# Patient Record
Sex: FEMALE | Race: WHITE | HISPANIC OR LATINO | Employment: PART TIME | URBAN - NONMETROPOLITAN AREA
[De-identification: names, ages, dates, MRNs, and addresses within clinical notes are randomized per-mention and may not be internally consistent; named-entity substitution may affect disease eponyms.]

---

## 2017-08-02 ENCOUNTER — HOSPITAL ENCOUNTER (EMERGENCY)
Facility: HOSPITAL | Age: 42
Discharge: HOME | End: 2017-08-02
Attending: PHYSICIAN ASSISTANT | Admitting: PHYSICIAN ASSISTANT
Payer: MEDICARE

## 2017-08-02 VITALS
HEART RATE: 70 BPM | SYSTOLIC BLOOD PRESSURE: 128 MMHG | RESPIRATION RATE: 16 BRPM | OXYGEN SATURATION: 97 % | TEMPERATURE: 98.2 F | DIASTOLIC BLOOD PRESSURE: 92 MMHG

## 2017-08-02 DIAGNOSIS — R53.1 WEAKNESS: ICD-10-CM

## 2017-08-02 DIAGNOSIS — R00.2 PALPITATIONS: ICD-10-CM

## 2017-08-02 DIAGNOSIS — E16.2 HYPOGLYCEMIA: ICD-10-CM

## 2017-08-02 LAB
ALBUMIN SERPL-MCNC: 3.7 G/DL (ref 3.4–5)
ALP SERPL-CCNC: 126 U/L (ref 40–150)
ALT SERPL W P-5'-P-CCNC: 25 U/L (ref 0–50)
ANION GAP SERPL CALCULATED.3IONS-SCNC: 5 MMOL/L (ref 3–14)
AST SERPL W P-5'-P-CCNC: 17 U/L (ref 0–45)
BASOPHILS # BLD AUTO: 0 10E9/L (ref 0–0.2)
BASOPHILS NFR BLD AUTO: 0.6 %
BILIRUB SERPL-MCNC: 0.5 MG/DL (ref 0.2–1.3)
BUN SERPL-MCNC: 17 MG/DL (ref 7–30)
CALCIUM SERPL-MCNC: 8.4 MG/DL (ref 8.5–10.1)
CHLORIDE SERPL-SCNC: 108 MMOL/L (ref 94–109)
CO2 SERPL-SCNC: 31 MMOL/L (ref 20–32)
CREAT SERPL-MCNC: 0.92 MG/DL (ref 0.52–1.04)
D DIMER PPP DDU-MCNC: <200 NG/ML D-DU (ref 0–300)
DIFFERENTIAL METHOD BLD: NORMAL
EOSINOPHIL # BLD AUTO: 0.1 10E9/L (ref 0–0.7)
EOSINOPHIL NFR BLD AUTO: 1.3 %
ERYTHROCYTE [DISTWIDTH] IN BLOOD BY AUTOMATED COUNT: 13 % (ref 10–15)
GFR SERPL CREATININE-BSD FRML MDRD: 67 ML/MIN/1.7M2
GLUCOSE SERPL-MCNC: 62 MG/DL (ref 70–99)
HCT VFR BLD AUTO: 42.3 % (ref 35–47)
HGB BLD-MCNC: 14.2 G/DL (ref 11.7–15.7)
IMM GRANULOCYTES # BLD: 0 10E9/L (ref 0–0.4)
IMM GRANULOCYTES NFR BLD: 0.2 %
LYMPHOCYTES # BLD AUTO: 2.4 10E9/L (ref 0.8–5.3)
LYMPHOCYTES NFR BLD AUTO: 38 %
MCH RBC QN AUTO: 29.8 PG (ref 26.5–33)
MCHC RBC AUTO-ENTMCNC: 33.6 G/DL (ref 31.5–36.5)
MCV RBC AUTO: 89 FL (ref 78–100)
MONOCYTES # BLD AUTO: 0.5 10E9/L (ref 0–1.3)
MONOCYTES NFR BLD AUTO: 7.9 %
NEUTROPHILS # BLD AUTO: 3.3 10E9/L (ref 1.6–8.3)
NEUTROPHILS NFR BLD AUTO: 52 %
NRBC # BLD AUTO: 0 10*3/UL
NRBC BLD AUTO-RTO: 0 /100
PLATELET # BLD AUTO: 236 10E9/L (ref 150–450)
POTASSIUM SERPL-SCNC: 3.8 MMOL/L (ref 3.4–5.3)
PROT SERPL-MCNC: 7.2 G/DL (ref 6.8–8.8)
RBC # BLD AUTO: 4.77 10E12/L (ref 3.8–5.2)
SODIUM SERPL-SCNC: 144 MMOL/L (ref 133–144)
TROPONIN I SERPL-MCNC: NORMAL UG/L (ref 0–0.04)
WBC # BLD AUTO: 6.3 10E9/L (ref 4–11)

## 2017-08-02 PROCEDURE — 85025 COMPLETE CBC W/AUTO DIFF WBC: CPT | Performed by: PHYSICIAN ASSISTANT

## 2017-08-02 PROCEDURE — 85379 FIBRIN DEGRADATION QUANT: CPT | Performed by: PHYSICIAN ASSISTANT

## 2017-08-02 PROCEDURE — 84484 ASSAY OF TROPONIN QUANT: CPT | Performed by: PHYSICIAN ASSISTANT

## 2017-08-02 PROCEDURE — 99284 EMERGENCY DEPT VISIT MOD MDM: CPT | Mod: 25

## 2017-08-02 PROCEDURE — 96360 HYDRATION IV INFUSION INIT: CPT

## 2017-08-02 PROCEDURE — 99284 EMERGENCY DEPT VISIT MOD MDM: CPT | Performed by: PHYSICIAN ASSISTANT

## 2017-08-02 PROCEDURE — 25000128 H RX IP 250 OP 636: Performed by: PHYSICIAN ASSISTANT

## 2017-08-02 PROCEDURE — 36415 COLL VENOUS BLD VENIPUNCTURE: CPT | Performed by: PHYSICIAN ASSISTANT

## 2017-08-02 PROCEDURE — 93005 ELECTROCARDIOGRAM TRACING: CPT

## 2017-08-02 PROCEDURE — 80053 COMPREHEN METABOLIC PANEL: CPT | Performed by: PHYSICIAN ASSISTANT

## 2017-08-02 PROCEDURE — 93010 ELECTROCARDIOGRAM REPORT: CPT | Performed by: INTERNAL MEDICINE

## 2017-08-02 RX ORDER — LORATADINE 10 MG/1
10 TABLET ORAL DAILY
COMMUNITY

## 2017-08-02 RX ORDER — LIDOCAINE 40 MG/G
CREAM TOPICAL
Status: DISCONTINUED | OUTPATIENT
Start: 2017-08-02 | End: 2017-08-02 | Stop reason: HOSPADM

## 2017-08-02 RX ORDER — SODIUM CHLORIDE 9 MG/ML
1000 INJECTION, SOLUTION INTRAVENOUS CONTINUOUS
Status: DISCONTINUED | OUTPATIENT
Start: 2017-08-02 | End: 2017-08-02 | Stop reason: HOSPADM

## 2017-08-02 RX ORDER — CETIRIZINE HYDROCHLORIDE 10 MG/1
5 TABLET ORAL DAILY
COMMUNITY

## 2017-08-02 RX ORDER — MULTIVITAMIN,THERAPEUTIC
1 TABLET ORAL DAILY
COMMUNITY

## 2017-08-02 RX ADMIN — SODIUM CHLORIDE 1000 ML: 9 INJECTION, SOLUTION INTRAVENOUS at 14:24

## 2017-08-02 ASSESSMENT — ENCOUNTER SYMPTOMS
APPETITE CHANGE: 0
DIZZINESS: 1
VOMITING: 0
PALPITATIONS: 1
BRUISES/BLEEDS EASILY: 0
FATIGUE: 1
WEAKNESS: 0
ACTIVITY CHANGE: 0
ABDOMINAL PAIN: 0
FEVER: 0
NUMBNESS: 0
LIGHT-HEADEDNESS: 1
HEADACHES: 1
SHORTNESS OF BREATH: 0
CHEST TIGHTNESS: 0
NAUSEA: 0
CHILLS: 0

## 2017-08-02 NOTE — ED AVS SNAPSHOT
[facility]    [address]    [place] [address]    Phone:  [phone]                                       Brittany Arcos   MRN: [id number]    Department:  [facility]   Date of Visit:  8/2/2017           After Visit Summary Signature Page     I have received my discharge instructions, and my questions have been answered. I have discussed any challenges I see with this plan with the nurse or doctor.    ..........................................................................................................................................  Patient/Patient Representative Signature      ..........................................................................................................................................  Patient Representative Print Name and Relationship to Patient    ..................................................               ................................................  Date                                            Time    ..........................................................................................................................................  Reviewed by Signature/Title    ...................................................              ..............................................  Date                                                            Time

## 2017-08-02 NOTE — ED PROVIDER NOTES
"  History     Chief Complaint   Patient presents with     Palpitations     notes felt like her heart was racing earlier, notes thought her blood sugar was low so had a sandwich and fruit and still not feeling better. states lightheaded at present and \"face feels like its on fire\"     Headache     c/o h/a     HPI  Brittany Arcos is a 41 year old female who presented to the ED ambulatory for evaluation of palpitations, dizziness, and lightheadedness.  Brittany was in the facility and acting as a  for a friend that is having surgery.  She had not eaten and began to feel like her heart was racing and her face was hot.  She went to the cafeteria and ate.  She began to feel a little better and then dizzy and lightheaded.  She went to the  and asked if there was a place that could check her BP.  She was referred to the  and then here.  During my interview, Brittany tells me that she doesn't feel very good, \"But that's not really out of the ordinary for me ever since I had my gastric bypass.\"  Mild headache.  This is apparently no totally strange for her either. No falls.  No chest pain.  No dyspnea.  Remote hx of WPW with ablation.  No abdominal pain.  No vomiting.      I have reviewed the Medications, Allergies, Past Medical and Surgical History, and Social History in the Epic system.    Allergies:   Allergies   Allergen Reactions     Amoxicillin      Aspirin      Ceclor [Cefaclor]      Cefzil [Cefprozil]      Clindamycin      Keflex [Cephalexin]      Latex      Penicillins          No current facility-administered medications on file prior to encounter.   No current outpatient prescriptions on file prior to encounter.    There is no problem list on file for this patient.      No past surgical history on file.    Social History   Substance Use Topics     Smoking status: Not on file     Smokeless tobacco: Not on file     Alcohol use Not on file         There is no immunization history on file for this " patient.    BMI: There is no height or weight on file to calculate BMI.      Review of Systems   Constitutional: Positive for fatigue. Negative for activity change, appetite change, chills and fever.   Respiratory: Negative for chest tightness and shortness of breath.    Cardiovascular: Positive for palpitations. Negative for chest pain and leg swelling.   Gastrointestinal: Negative for abdominal pain, nausea and vomiting.   Genitourinary: Negative.    Skin: Negative.    Neurological: Positive for dizziness, light-headedness and headaches. Negative for weakness and numbness.   Hematological: Does not bruise/bleed easily.       Physical Exam   BP: 146/95  Heart Rate: 104  Temp: 97.8  F (36.6  C)  Resp: 14  SpO2: 98 %  Physical Exam   Constitutional: She is oriented to person, place, and time. She appears well-developed and well-nourished. No distress.   Pleasant and talkative    Cardiovascular: Normal rate and regular rhythm.    Pulmonary/Chest: Effort normal and breath sounds normal.   Abdominal: Soft. There is no tenderness. There is no guarding.   Neurological: She is alert and oriented to person, place, and time.   No focal concerns    Skin: Skin is warm and dry.   Psychiatric: She has a normal mood and affect.   Nursing note and vitals reviewed.      ED Course     ED Course     Procedures        EKG shows NSR with sinus arrhythmia.  No signs of pre-excitation, ST or T wave concerns.      Medications   lidocaine 1 % 1 mL (not administered)   lidocaine (LMX4) kit (not administered)   sodium chloride (PF) 0.9% PF flush 3 mL (not administered)   sodium chloride (PF) 0.9% PF flush 3 mL (not administered)   0.9% sodium chloride BOLUS (0 mLs Intravenous Stopped 8/2/17 1521)     Followed by   0.9% sodium chloride infusion (not administered)     Results for orders placed or performed during the hospital encounter of 08/02/17 (from the past 24 hour(s))   CBC with platelets differential   Result Value Ref Range    WBC 6.3  4.0 - 11.0 10e9/L    RBC Count 4.77 3.8 - 5.2 10e12/L    Hemoglobin 14.2 11.7 - 15.7 g/dL    Hematocrit 42.3 35.0 - 47.0 %    MCV 89 78 - 100 fl    MCH 29.8 26.5 - 33.0 pg    MCHC 33.6 31.5 - 36.5 g/dL    RDW 13.0 10.0 - 15.0 %    Platelet Count 236 150 - 450 10e9/L    Diff Method Automated Method     % Neutrophils 52.0 %    % Lymphocytes 38.0 %    % Monocytes 7.9 %    % Eosinophils 1.3 %    % Basophils 0.6 %    % Immature Granulocytes 0.2 %    Nucleated RBCs 0 0 /100    Absolute Neutrophil 3.3 1.6 - 8.3 10e9/L    Absolute Lymphocytes 2.4 0.8 - 5.3 10e9/L    Absolute Monocytes 0.5 0.0 - 1.3 10e9/L    Absolute Eosinophils 0.1 0.0 - 0.7 10e9/L    Absolute Basophils 0.0 0.0 - 0.2 10e9/L    Abs Immature Granulocytes 0.0 0 - 0.4 10e9/L    Absolute Nucleated RBC 0.0    D-Dimer (FV Range)   Result Value Ref Range    D-Dimer ng/mL <200 0 - 300 ng/ml D-DU   Comprehensive metabolic panel   Result Value Ref Range    Sodium 144 133 - 144 mmol/L    Potassium 3.8 3.4 - 5.3 mmol/L    Chloride 108 94 - 109 mmol/L    Carbon Dioxide 31 20 - 32 mmol/L    Anion Gap 5 3 - 14 mmol/L    Glucose 62 (L) 70 - 99 mg/dL    Urea Nitrogen 17 7 - 30 mg/dL    Creatinine 0.92 0.52 - 1.04 mg/dL    GFR Estimate 67 >60 mL/min/1.7m2    GFR Estimate If Black 81 >60 mL/min/1.7m2    Calcium 8.4 (L) 8.5 - 10.1 mg/dL    Bilirubin Total 0.5 0.2 - 1.3 mg/dL    Albumin 3.7 3.4 - 5.0 g/dL    Protein Total 7.2 6.8 - 8.8 g/dL    Alkaline Phosphatase 126 40 - 150 U/L    ALT 25 0 - 50 U/L    AST 17 0 - 45 U/L   Troponin I   Result Value Ref Range    Troponin I ES  0.000 - 0.045 ug/L     <0.015  The 99th percentile for upper reference range is 0.045 ug/L.  Troponin values in   the range of 0.045 - 0.120 ug/L may be associated with risks of adverse   clinical events.         Critical Care time:  none               Labs Ordered and Resulted from Time of ED Arrival Up to the Time of Departure from the ED   COMPREHENSIVE METABOLIC PANEL - Abnormal; Notable for the  following:        Result Value    Glucose 62 (*)     Calcium 8.4 (*)     All other components within normal limits   CBC WITH PLATELETS DIFFERENTIAL   D-DIMER (FV RANGE)   TROPONIN I   PERIPHERAL IV CATHETER       Assessments & Plan (with Medical Decision Making)   I doubt the significance of the glucose and not being diabetic.  This is apparently a chronic issue with her. Can be treated with juice and food.  Feels better.  IV fluids.  Labs otherwise negative.  No reasonable medical evidence for further treatment or work-up.  EKG looks good.  Brittany would like leave to drive her friend home from surgery.  This is certainly reasonable.  Rest and eat.  Return here for ANY other concerns or questions.     I have reviewed the nursing notes.    I have reviewed the findings, diagnosis, plan and need for follow up with the patient.       Discharge Medication List as of 8/2/2017  3:22 PM          Final diagnoses:   Palpitations   Weakness   Hypoglycemia       8/2/2017   [facility]     Jaquelin Chapin PA-C  08/02/17 9583

## 2017-08-02 NOTE — ED NOTES
Arrived to ED room 7 with c/o palpitations that started this morning. States thought it was her blood sugar or blood pressure initially but states palpitations are getting more frequent. Denies chest pain or SOB. 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 C/o headache rated 5/10. Cardiac monitor placed which is showing SR in the 90's. Call light within reach.

## 2017-08-02 NOTE — ED AVS SNAPSHOT
" [facility]    [address] [place] [address]    Phone:  [phone]                                       Brittany Arcos   MRN: [id number]    Department[facility]  [facility]   Date of Visit:  8/2/2017           Patient Information     Date Of Birth          1975        Your diagnoses for this visit were:     Palpitations     Weakness        You were seen by Jaquelin Chapin PA-C.        Discharge Instructions         * Heart Palpitations    Palpitations refers to the feeling that your heart is beating hard, fast or irregular. Some people describe it as \"pounding\" or \"skipped beats\". Palpitations may occur in persons with heart disease, but can also occur in healthy persons. Your doctor does not believe that anything dangerous is causing your symptoms at this time.  Heart-Related Causes:    Arrhythmia (a change from the heart's normal rhythm)    Disease of the heart valves  Non-Heart-Related Causes:    Certain medicines (such as asthma inhalers and decongestants)    Some herbal supplements, energy drinks and pills, and weight loss pills    Illegal stimulant drugs (such as cocaine, crank, methamphetamine, PCP)    Caffeine, alcohol and tobacco    Medical conditions such as thyroid disease, anemia, anxiety and panic disorder  Sometimes the cause cannot be found.  Home Care:  1. Avoid excess caffeine, alcohol, tobacco and any stimulant drugs.  2. Tell your doctor about any prescription or over-the-counter or herbal medicines you take.  Follow Up  with your doctor or as advised by our staff.  Get Prompt Medical Attention  if any of the following occur together with palpitations:    Weakness, dizziness, light-headed or fainting    Chest pain or shortness of breath    Rapid heart rate (over 120 beats per minute, at rest)    Palpitations that lasts over 20 minutes    Weakness of an arm or leg or one side of the face    Difficulty with speech or vision    [id number] [employer] " [employer]. [address] [place] [address]268. All rights reserved. This information is not intended as a substitute for professional medical care. Always follow your healthcare professional's instructions.      Please follow-up in the clinic.     Rest and stay hydrated.    Please return HERE for ANY worsening symptoms or other concerns.        Review of your medicines      Our records show that you are taking the medicines listed below. If these are incorrect, please call your family doctor or clinic.        Dose / Directions Last dose taken    cetirizine 10 MG tablet   Commonly known as:  zyrTEC   Dose:  5 mg        Take 5 mg by mouth daily   Refills:  0        loratadine 10 MG tablet   Commonly known as:  CLARITIN   Dose:  10 mg        Take 10 mg by mouth daily   Refills:  0        multivitamin, therapeutic Tabs tablet   Dose:  1 tablet        Take 1 tablet by mouth daily   Refills:  0                Procedures and tests performed during your visit     CBC with platelets differential    Comprehensive metabolic panel    D-Dimer (FV Range)    EKG 12-lead, tracing only    Troponin I      Orders Needing Specimen Collection     None      Pending Results     No orders found from 7/31/2017 to 8/3/2017.            Pending Culture Results     No orders found from 7/31/2017 to 8/3/2017.            Thank you for choosing [facility]       Thank you for choosing [facility] for your care. Our goal is always to provide you with excellent care. Hearing back from our patients is one way we can continue to improve our services. Please take a few minutes to complete the written survey that you may receive in the mail after you visit with us. Thank you!        [facility] Information     [employer] lets you send messages to your doctor, view your test results, renew your prescriptions, schedule appointments and more. To sign up, go to [email or web address] . Click on \"Log in\" on the left side of the screen, which will take you to " "the Welcome page. Then click on \"Sign up Now\" on the right side of the page.     You will be asked to enter the access code listed below, as well as some personal information. Please follow the directions to create your username and password.     Your access code is: [email or web address]  Expires: 10/31/2017  3:21 PM     Your access code will  in 90 days. If you need help or a new code, please call your [facility] clinic or [phone].        Care EveryWhere ID     This is your Care EveryWhere ID. This could be used by other organizations to access your [facility] medical records  [id number]        Equal Access to Services     [facility] : Deena Egan, niyah severino, sana rice, lucio calhoun. So [facility] [phone].    ATENCIÓN: Si habla español, tiene a toure disposición servicios gratuitos de asistencia lingüística. Llame al [phone].    We comply with applicable federal civil rights laws and [place] laws. We do not discriminate on the basis of race, color, national origin, age, disability sex, sexual orientation or gender identity.            After Visit Summary       This is your record. Keep this with you and show to your community pharmacist(s) and doctor(s) at your next visit.                  "

## 2017-08-02 NOTE — ED NOTES
Discharge instructions given. Verbalized understanding. Surgery staff called and informed that patient is on her way there to  her friend per patient's request. IV removed. Catheter intact. Coban applied. VSS. Denies any further palpitations. Ambulated out of ED independently without difficulty.

## 2017-08-02 NOTE — DISCHARGE INSTRUCTIONS
"  * Heart Palpitations    Palpitations refers to the feeling that your heart is beating hard, fast or irregular. Some people describe it as \"pounding\" or \"skipped beats\". Palpitations may occur in persons with heart disease, but can also occur in healthy persons. Your doctor does not believe that anything dangerous is causing your symptoms at this time.  Heart-Related Causes:    Arrhythmia (a change from the heart's normal rhythm)    Disease of the heart valves  Non-Heart-Related Causes:    Certain medicines (such as asthma inhalers and decongestants)    Some herbal supplements, energy drinks and pills, and weight loss pills    Illegal stimulant drugs (such as cocaine, crank, methamphetamine, PCP)    Caffeine, alcohol and tobacco    Medical conditions such as thyroid disease, anemia, anxiety and panic disorder  Sometimes the cause cannot be found.  Home Care:  1. Avoid excess caffeine, alcohol, tobacco and any stimulant drugs.  2. Tell your doctor about any prescription or over-the-counter or herbal medicines you take.  Follow Up  with your doctor or as advised by our staff.  Get Prompt Medical Attention  if any of the following occur together with palpitations:    Weakness, dizziness, light-headed or fainting    Chest pain or shortness of breath    Rapid heart rate (over 120 beats per minute, at rest)    Palpitations that lasts over 20 minutes    Weakness of an arm or leg or one side of the face    Difficulty with speech or vision    [phone] [employer]. [address] [place] [address]. All rights reserved. This information is not intended as a substitute for professional medical care. Always follow your healthcare professional's instructions.      Please follow-up in the clinic.     Rest and stay hydrated.    Please return HERE for ANY worsening symptoms or other concerns.   "

## 2017-08-02 NOTE — ED NOTES
Called to surgery to inform patient of blood sugar of 62. Patient states it was 149 just before coming to ER. Juice and cookies given to patient by surgical staff.

## 2017-08-26 ENCOUNTER — HEALTH MAINTENANCE LETTER (OUTPATIENT)
Age: 42
End: 2017-08-26

## 2019-11-07 ENCOUNTER — HEALTH MAINTENANCE LETTER (OUTPATIENT)
Age: 44
End: 2019-11-07

## 2020-02-16 ENCOUNTER — HEALTH MAINTENANCE LETTER (OUTPATIENT)
Age: 45
End: 2020-02-16

## 2020-11-29 ENCOUNTER — HEALTH MAINTENANCE LETTER (OUTPATIENT)
Age: 45
End: 2020-11-29

## 2021-02-14 ENCOUNTER — HEALTH MAINTENANCE LETTER (OUTPATIENT)
Age: 46
End: 2021-02-14

## 2021-04-10 ENCOUNTER — HEALTH MAINTENANCE LETTER (OUTPATIENT)
Age: 46
End: 2021-04-10

## 2021-05-09 ENCOUNTER — TELEPHONE (OUTPATIENT)
Dept: BEHAVIORAL HEALTH | Facility: CLINIC | Age: 46
End: 2021-05-09

## 2021-05-09 ENCOUNTER — HOSPITAL ENCOUNTER (EMERGENCY)
Facility: CLINIC | Age: 46
Discharge: PSYCHIATRIC OR SUBSTANCE USE FACILITY | End: 2021-05-10
Attending: FAMILY MEDICINE | Admitting: FAMILY MEDICINE
Payer: COMMERCIAL

## 2021-05-09 DIAGNOSIS — R45.851 SUICIDAL IDEATION: ICD-10-CM

## 2021-05-09 DIAGNOSIS — F22 PARANOIA (H): ICD-10-CM

## 2021-05-09 LAB
ALBUMIN SERPL-MCNC: 3.9 G/DL (ref 3.4–5)
ALBUMIN UR-MCNC: 30 MG/DL
ALP SERPL-CCNC: 102 U/L (ref 40–150)
ALT SERPL W P-5'-P-CCNC: 23 U/L (ref 0–50)
AMPHETAMINES UR QL: NOT DETECTED NG/ML
ANION GAP SERPL CALCULATED.3IONS-SCNC: 7 MMOL/L (ref 3–14)
APPEARANCE UR: ABNORMAL
AST SERPL W P-5'-P-CCNC: 17 U/L (ref 0–45)
BACTERIA #/AREA URNS HPF: ABNORMAL /HPF
BARBITURATES UR QL SCN: NOT DETECTED NG/ML
BASOPHILS # BLD AUTO: 0 10E9/L (ref 0–0.2)
BASOPHILS NFR BLD AUTO: 0.8 %
BENZODIAZ UR QL SCN: NOT DETECTED NG/ML
BILIRUB SERPL-MCNC: 0.7 MG/DL (ref 0.2–1.3)
BILIRUB UR QL STRIP: NEGATIVE
BUN SERPL-MCNC: 12 MG/DL (ref 7–30)
BUPRENORPHINE UR QL: NOT DETECTED NG/ML
CALCIUM SERPL-MCNC: 9.1 MG/DL (ref 8.5–10.1)
CANNABINOIDS UR QL: NOT DETECTED NG/ML
CHLORIDE SERPL-SCNC: 106 MMOL/L (ref 94–109)
CO2 SERPL-SCNC: 28 MMOL/L (ref 20–32)
COCAINE UR QL SCN: NOT DETECTED NG/ML
COLOR UR AUTO: YELLOW
CREAT SERPL-MCNC: 0.82 MG/DL (ref 0.52–1.04)
D-METHAMPHET UR QL: NOT DETECTED NG/ML
DIFFERENTIAL METHOD BLD: NORMAL
EOSINOPHIL # BLD AUTO: 0 10E9/L (ref 0–0.7)
EOSINOPHIL NFR BLD AUTO: 0.2 %
ERYTHROCYTE [DISTWIDTH] IN BLOOD BY AUTOMATED COUNT: 13.2 % (ref 10–15)
ETHANOL SERPL-MCNC: <0.01 G/DL
GFR SERPL CREATININE-BSD FRML MDRD: 86 ML/MIN/{1.73_M2}
GLUCOSE SERPL-MCNC: 100 MG/DL (ref 70–99)
GLUCOSE UR STRIP-MCNC: NEGATIVE MG/DL
HCG SERPL QL: NEGATIVE
HCT VFR BLD AUTO: 41.4 % (ref 35–47)
HGB BLD-MCNC: 13.8 G/DL (ref 11.7–15.7)
HGB UR QL STRIP: NEGATIVE
IMM GRANULOCYTES # BLD: 0 10E9/L (ref 0–0.4)
IMM GRANULOCYTES NFR BLD: 0.2 %
KETONES UR STRIP-MCNC: 20 MG/DL
LABORATORY COMMENT REPORT: NORMAL
LEUKOCYTE ESTERASE UR QL STRIP: NEGATIVE
LYMPHOCYTES # BLD AUTO: 1.4 10E9/L (ref 0.8–5.3)
LYMPHOCYTES NFR BLD AUTO: 26.6 %
MCH RBC QN AUTO: 29.1 PG (ref 26.5–33)
MCHC RBC AUTO-ENTMCNC: 33.3 G/DL (ref 31.5–36.5)
MCV RBC AUTO: 87 FL (ref 78–100)
METHADONE UR QL SCN: NOT DETECTED NG/ML
MONOCYTES # BLD AUTO: 0.4 10E9/L (ref 0–1.3)
MONOCYTES NFR BLD AUTO: 7.1 %
MUCOUS THREADS #/AREA URNS LPF: PRESENT /LPF
NEUTROPHILS # BLD AUTO: 3.5 10E9/L (ref 1.6–8.3)
NEUTROPHILS NFR BLD AUTO: 65.1 %
NITRATE UR QL: NEGATIVE
NRBC # BLD AUTO: 0 10*3/UL
NRBC BLD AUTO-RTO: 0 /100
OPIATES UR QL SCN: NOT DETECTED NG/ML
OXYCODONE UR QL SCN: NOT DETECTED NG/ML
PCP UR QL SCN: NOT DETECTED NG/ML
PH UR STRIP: 5 PH (ref 5–7)
PLATELET # BLD AUTO: 228 10E9/L (ref 150–450)
POTASSIUM SERPL-SCNC: 3.4 MMOL/L (ref 3.4–5.3)
PROPOXYPH UR QL: NOT DETECTED NG/ML
PROT SERPL-MCNC: 6.8 G/DL (ref 6.8–8.8)
RBC # BLD AUTO: 4.75 10E12/L (ref 3.8–5.2)
RBC #/AREA URNS AUTO: 1 /HPF (ref 0–2)
SARS-COV-2 RNA RESP QL NAA+PROBE: NEGATIVE
SODIUM SERPL-SCNC: 141 MMOL/L (ref 133–144)
SOURCE: ABNORMAL
SP GR UR STRIP: 1.02 (ref 1–1.03)
SPECIMEN SOURCE: NORMAL
SQUAMOUS #/AREA URNS AUTO: 3 /HPF (ref 0–1)
TRICYCLICS UR QL SCN: NOT DETECTED NG/ML
UROBILINOGEN UR STRIP-MCNC: 2 MG/DL (ref 0–2)
WBC # BLD AUTO: 5.3 10E9/L (ref 4–11)
WBC #/AREA URNS AUTO: 3 /HPF (ref 0–5)

## 2021-05-09 PROCEDURE — 85025 COMPLETE CBC W/AUTO DIFF WBC: CPT | Performed by: FAMILY MEDICINE

## 2021-05-09 PROCEDURE — 80053 COMPREHEN METABOLIC PANEL: CPT | Performed by: FAMILY MEDICINE

## 2021-05-09 PROCEDURE — C9803 HOPD COVID-19 SPEC COLLECT: HCPCS | Performed by: FAMILY MEDICINE

## 2021-05-09 PROCEDURE — 84703 CHORIONIC GONADOTROPIN ASSAY: CPT | Performed by: FAMILY MEDICINE

## 2021-05-09 PROCEDURE — 99285 EMERGENCY DEPT VISIT HI MDM: CPT | Performed by: FAMILY MEDICINE

## 2021-05-09 PROCEDURE — 90791 PSYCH DIAGNOSTIC EVALUATION: CPT

## 2021-05-09 PROCEDURE — 36415 COLL VENOUS BLD VENIPUNCTURE: CPT | Performed by: FAMILY MEDICINE

## 2021-05-09 PROCEDURE — 99285 EMERGENCY DEPT VISIT HI MDM: CPT | Mod: 25 | Performed by: FAMILY MEDICINE

## 2021-05-09 PROCEDURE — 87635 SARS-COV-2 COVID-19 AMP PRB: CPT | Performed by: FAMILY MEDICINE

## 2021-05-09 PROCEDURE — 82077 ASSAY SPEC XCP UR&BREATH IA: CPT | Performed by: FAMILY MEDICINE

## 2021-05-09 PROCEDURE — 81001 URINALYSIS AUTO W/SCOPE: CPT | Performed by: FAMILY MEDICINE

## 2021-05-09 PROCEDURE — 80306 DRUG TEST PRSMV INSTRMNT: CPT | Performed by: FAMILY MEDICINE

## 2021-05-09 NOTE — ED NOTES
Lab here-blood draw successful. Pt sleepy but cooperated with blood draw with support and encouragement

## 2021-05-09 NOTE — ED PROVIDER NOTES
Emergency Department Patient Sign-out       Brief HPI:  This is a 45 year old female signed out to me by Dr. Grimes .  See initial ED Provider note for details of the presentation.       Patient is medically cleared for admission to a Behavioral Health unit.      The patient is on a  hold.  The patient arrived on [facility] hold and has now been placed on a 72 hour hold.    The patient has required medication for agitation since arrival here.    Awaiting Transfer to Mental Health Facility      Significant Events prior to my assuming care:     7:46 PM Patient remains stable. No agitation. Notified that psychiatric bed will be billable at midnight tonight.  8:15 PM Patient anxious at this time. She at 1/2 of a hamburger. She is sitting up in the bed, tells me she is worried about all the children and what they are going to do and how will be safe.  She is tangential.  Conversation jumps around.  Patient's son phoned and plans to come sit with the patient. Patient displays paranoid behavior.  8:28 PM son is here sitting with patient at the bedside.  9:15 PM: long discussion with patient's son. He knows the plan for transfer to [facility] when bed is available, which we were told would be at midnight.  10:32 PM Patient resting. No agitation at this time. She was up to void a little while ago, no acute distress.  12:22 AM Patient resting.  Bed is available at [facility]. Awaiting ambulance arrival for transport.  12:44 AM ambulance here for transport. Patient stable.     Exam:   Patient Vitals for the past 24 hrs:   BP Temp Pulse Resp SpO2   05/09/21 1730 -- -- 68 11 --   05/09/21 1715 -- -- 75 13 --   05/09/21 1700 -- -- 93 22 --   05/09/21 1645 -- -- 72 16 --   05/09/21 1500 -- -- 73 15 --   05/09/21 1445 (!) 129/92 -- -- -- --   05/09/21 1430 -- -- 87 10 95 %   05/09/21 1415 -- -- 73 12 96 %   05/09/21 1400 -- -- 86 8 96 %   05/09/21 1345 -- -- 90 12 95 %   05/09/21 1330 -- -- 74 12 97 %   05/09/21 1200 131/78 --  64 12 98 %   05/09/21 1145 -- -- 87 16 100 %   05/09/21 1130 (!) 138/92 -- 71 16 99 %   05/09/21 1115 -- -- 64 14 98 %   05/09/21 1100 114/67 -- 68 -- 96 %   05/09/21 1045 -- -- 65 10 96 %   05/09/21 1025 -- 98.2  F (36.8  C) -- -- --   05/09/21 1015 -- -- 89 11 98 %   05/09/21 1000 120/74 -- 73 -- 95 %           ED RESULTS:   Results for orders placed or performed during the hospital encounter of 05/09/21 (from the past 24 hour(s))   Alcohol ethyl     Status: None    Collection Time: 05/09/21 10:41 AM   Result Value Ref Range    Ethanol g/dL <0.01 <0.01 g/dL   CBC with platelets differential     Status: None    Collection Time: 05/09/21 10:41 AM   Result Value Ref Range    WBC 5.3 4.0 - 11.0 10e9/L    RBC Count 4.75 3.8 - 5.2 10e12/L    Hemoglobin 13.8 11.7 - 15.7 g/dL    Hematocrit 41.4 35.0 - 47.0 %    MCV 87 78 - 100 fl    MCH 29.1 26.5 - 33.0 pg    MCHC 33.3 31.5 - 36.5 g/dL    RDW 13.2 10.0 - 15.0 %    Platelet Count 228 150 - 450 10e9/L    Diff Method Automated Method     % Neutrophils 65.1 %    % Lymphocytes 26.6 %    % Monocytes 7.1 %    % Eosinophils 0.2 %    % Basophils 0.8 %    % Immature Granulocytes 0.2 %    Nucleated RBCs 0 0 /100    Absolute Neutrophil 3.5 1.6 - 8.3 10e9/L    Absolute Lymphocytes 1.4 0.8 - 5.3 10e9/L    Absolute Monocytes 0.4 0.0 - 1.3 10e9/L    Absolute Eosinophils 0.0 0.0 - 0.7 10e9/L    Absolute Basophils 0.0 0.0 - 0.2 10e9/L    Abs Immature Granulocytes 0.0 0 - 0.4 10e9/L    Absolute Nucleated RBC 0.0    Comprehensive metabolic panel     Status: Abnormal    Collection Time: 05/09/21 10:41 AM   Result Value Ref Range    Sodium 141 133 - 144 mmol/L    Potassium 3.4 3.4 - 5.3 mmol/L    Chloride 106 94 - 109 mmol/L    Carbon Dioxide 28 20 - 32 mmol/L    Anion Gap 7 3 - 14 mmol/L    Glucose 100 (H) 70 - 99 mg/dL    Urea Nitrogen 12 7 - 30 mg/dL    Creatinine 0.82 0.52 - 1.04 mg/dL    GFR Estimate 86 >60 mL/min/[1.73_m2]    GFR Estimate If Black >90 >60 mL/min/[1.73_m2]    Calcium 9.1  8.5 - 10.1 mg/dL    Bilirubin Total 0.7 0.2 - 1.3 mg/dL    Albumin 3.9 3.4 - 5.0 g/dL    Protein Total 6.8 6.8 - 8.8 g/dL    Alkaline Phosphatase 102 40 - 150 U/L    ALT 23 0 - 50 U/L    AST 17 0 - 45 U/L   HCG qualitative Blood     Status: None    Collection Time: 05/09/21 10:41 AM   Result Value Ref Range    HCG Qualitative Serum Negative NEG^Negative   Asymptomatic SARS-CoV-2 COVID-19 Virus (Coronavirus) by PCR     Status: None    Collection Time: 05/09/21  2:57 PM    Specimen: Nasopharyngeal   Result Value Ref Range    SARS-CoV-2 Virus Specimen Source Nasopharyngeal     SARS-CoV-2 PCR Result NEGATIVE     SARS-CoV-2 PCR Comment (Note)    UA reflex to Microscopic     Status: Abnormal    Collection Time: 05/09/21  2:58 PM   Result Value Ref Range    Color Urine Yellow     Appearance Urine Slightly Cloudy     Glucose Urine Negative NEG^Negative mg/dL    Bilirubin Urine Negative NEG^Negative    Ketones Urine 20 (A) NEG^Negative mg/dL    Specific Gravity Urine 1.023 1.003 - 1.035    Blood Urine Negative NEG^Negative    pH Urine 5.0 5.0 - 7.0 pH    Protein Albumin Urine 30 (A) NEG^Negative mg/dL    Urobilinogen mg/dL 2.0 0.0 - 2.0 mg/dL    Nitrite Urine Negative NEG^Negative    Leukocyte Esterase Urine Negative NEG^Negative    Source Unspecified Urine     RBC Urine 1 0 - 2 /HPF    WBC Urine 3 0 - 5 /HPF    Bacteria Urine Few (A) NEG^Negative /HPF    Squamous Epithelial /HPF Urine 3 (H) 0 - 1 /HPF    Mucous Urine Present (A) NEG^Negative /LPF   Urine Drugs of Abuse Screen Panel 13     Status: None    Collection Time: 05/09/21  2:58 PM   Result Value Ref Range    Cannabinoids (14-vdk-0-carboxy-9-THC) Not Detected NDET^Not Detected ng/mL    Phencyclidine (Phencyclidine) Not Detected NDET^Not Detected ng/mL    Cocaine (Benzoylecgonine) Not Detected NDET^Not Detected ng/mL    Methamphetamine (d-Methamphetamine) Not Detected NDET^Not Detected ng/mL    Opiates (Morphine) Not Detected NDET^Not Detected ng/mL    Amphetamine  (d-Amphetamine) Not Detected NDET^Not Detected ng/mL    Benzodiazepines (Nordiazepam) Not Detected NDET^Not Detected ng/mL    Tricyclic Antidepressants (Desipramine) Not Detected NDET^Not Detected ng/mL    Methadone (Methadone) Not Detected NDET^Not Detected ng/mL    Barbiturates (Butalbital) Not Detected NDET^Not Detected ng/mL    Oxycodone (Oxycodone) Not Detected NDET^Not Detected ng/mL    Propoxyphene (Norpropoxyphene) Not Detected NDET^Not Detected ng/mL    Buprenorphine (Buprenorphine) Not Detected NDET^Not Detected ng/mL       ED MEDICATIONS:   Medications - No data to display      Impression:    ICD-10-CM    1. Paranoia (H)  F22    2. Suicidal ideation  R45.851        Plan:    Pending studies include none.        LELAND Brooks CNP          Toribio, LELAND Blum CNP  05/10/21 0044

## 2021-05-09 NOTE — TELEPHONE ENCOUNTER
S: Kailyn, [facility] ED, 45/F, SI w plan     B: Pt BIB via EMS, en route to ED, tried to jump out of the rig, medicated   Hx of bipolar   Pt reports not taking meds for a month, stopped using some community resources/supports   Adult daughter reporting pt has been paranoid, worried about the children, money going missing, electronics that are glitching   Pt reports not knowing what is real and what is not   Hx of SA via overdose of medications, last 2018   Pt reports SI w plan to shoot or overdose - reports thoughts   Pt reports some hallucinations about hearing conversations, sounds like a telepathic conversation     Medically cleared, eating, drinking, ambulating indep  Patient cleared and ready for behavioral bed placement: Yes   No covid concerns, test neg     A: 72 HH     R: 32/Katty/Oly     542pm - Intake called the ED to inquire about legal status. ED reports they will place a 72 HH   641pm - [facility]   647pm - [facility] accepts   Pt placed in queue   650pm - unit charge notified  655pm - Intake called ED and requested 72 HH be signed   711pm - ED notified of placement

## 2021-05-09 NOTE — ED NOTES
"Unable to draw labs. Pt stating \"somethings not right\" pulling arm away-tech unable to draw blood.  "

## 2021-05-09 NOTE — ED NOTES
"Up to bathroom-return to room. Swabbed for Covid. Asked \"are my kids ok?\" Informed her that  I had updated her daughter and we were waiting for her to wake up so she could be evaluated.  "

## 2021-05-09 NOTE — ED NOTES
Updated daughter and pt on one hour wait for DEC . Pt's son on speaker phone requesting to be kept in the loop.

## 2021-05-09 NOTE — ED NOTES
Dinner tray brought to room-pt remains asleep. Daughter leaving at this time while we await bed placement.

## 2021-05-09 NOTE — ED NOTES
"Speaking with Dec -daughter in family room. Daughter spoke with writer outside of room. States her mom has been off her med's-she had been taking a mood stabilizer  and also takes cardiac medications but did not know what there are. Her records are through [facility] in [place] where she has been in patient in the Mental Health unit. \"She has been paranoid and thinks her boyfriend is trying to get into her accounts on line and take all her money, she told leave my phone in the house and go outside to talk\". Pt currently staying with Son in [place] but called her daughter at 0430 this AM. Daughter sates she was talking about suicide and has made an attempt in the past \"she swallowed a bunch of pills\" per daughter.   "

## 2021-05-09 NOTE — ED TRIAGE NOTES
Pt presents on hold from [place] for suicidal ideation and threats. Pt was given a IM shot to calm her down. 5 mg haldol and 50 mg benadryl. Pt made statements to PD and medics about being suicidal and that she was a bad mother, wanting someone to kill her or harm herself

## 2021-05-09 NOTE — ED PROVIDER NOTES
History     Chief Complaint   Patient presents with     Suicidal     HPI  Brittany Durbin is a 45 year old female who presents via EMS on a transport hold because of suicidal ideation.  Unsure exactly of what precipitated things but from what I can piece together from EMS, patient is down here visiting from [place] with friends.  Sounds like she was fine last few days but then this morning started having thoughts that she was a bad mother.  The family there called EMS and police.  When they arrived patient was making statements of wanting to hurt herself and threatening others.  She was very aggressive and combative.  EMS had to chemically restrain her to transport her here.  Upon arrival she is very sleepy and little information is obtained from her.    Patient apparently is also hearing voices and the voices are what are telling her to hurt herself.  She apparently does have a history of bipolar disorder and has not been taking her meds for maybe months.    Allergies:  Allergies   Allergen Reactions     Penicillins Rash       Problem List:    There are no active problems to display for this patient.       Past Medical History:    Past Medical History:   Diagnosis Date     Bipolar affective disorder (H)      Cervical dysplasia      Obesity      Other depressive disorder        Past Surgical History:    Past Surgical History:   Procedure Laterality Date     LAPAROSCOPIC BYPASS GASTRIC      No Comments Provided     LAPAROSCOPY DIAGNOSTIC (GENERAL)      1998     OTHER SURGICAL HISTORY      2003,12139.0,DC LIGATE FALLOPIAN TUBE     RELEASE CARPAL TUNNEL      2006,left     RELEASE CARPAL TUNNEL      2004,right     SEPTOPLASTY      2005,turbinoplasty     TONSILLECTOMY      2005       Family History:    Family History   Problem Relation Age of Onset     Diabetes Sister         Diabetes     Hypertension Father         Hypertension     Heart Disease Sister         Heart Disease     Heart Disease Maternal Grandfather          Heart Disease     Other - See Comments Mother         bipolor       Social History:  Marital Status:  Single [1]  Social History     Tobacco Use     Smoking status: Current Every Day Smoker     Packs/day: 0.25     Years: 10.00     Pack years: 2.50     Types: Cigarettes     Last attempt to quit: 2002     Years since quittin.3     Smokeless tobacco: Never Used   Substance Use Topics     Alcohol use: No     Alcohol/week: 0.0 standard drinks     Drug use: Unknown     Types: Other     Comment: Drug use: No        Medications:    No current outpatient medications on file.        Review of Systems   Unable to perform ROS: Psychiatric disorder       Physical Exam   BP: 120/74  Pulse: 73  Temp: 98.2  F (36.8  C)  Resp: 11  SpO2: 95 %      Physical Exam  Vitals signs and nursing note reviewed.   Constitutional:       General: She is not in acute distress.     Appearance: She is well-developed. She is not diaphoretic.   Eyes:      Conjunctiva/sclera: Conjunctivae normal.   Neck:      Musculoskeletal: Normal range of motion and neck supple.   Cardiovascular:      Rate and Rhythm: Normal rate and regular rhythm.      Heart sounds: Normal heart sounds. No murmur. No friction rub. No gallop.    Pulmonary:      Effort: Pulmonary effort is normal. No respiratory distress.      Breath sounds: Normal breath sounds. No wheezing or rales.   Chest:      Chest wall: No tenderness.   Abdominal:      General: Bowel sounds are normal. There is no distension.      Palpations: Abdomen is soft. There is no mass.      Tenderness: There is no abdominal tenderness. There is no guarding.   Musculoskeletal: Normal range of motion.         General: No tenderness.   Skin:     General: Skin is warm and dry.      Findings: No rash.   Neurological:      Mental Status: She is alert and oriented to person, place, and time.   Psychiatric:         Judgment: Judgment normal.         ED Course        Procedures        Results for orders placed  or performed during the hospital encounter of 05/09/21 (from the past 24 hour(s))   Alcohol ethyl   Result Value Ref Range    Ethanol g/dL <0.01 <0.01 g/dL   CBC with platelets differential   Result Value Ref Range    WBC 5.3 4.0 - 11.0 10e9/L    RBC Count 4.75 3.8 - 5.2 10e12/L    Hemoglobin 13.8 11.7 - 15.7 g/dL    Hematocrit 41.4 35.0 - 47.0 %    MCV 87 78 - 100 fl    MCH 29.1 26.5 - 33.0 pg    MCHC 33.3 31.5 - 36.5 g/dL    RDW 13.2 10.0 - 15.0 %    Platelet Count 228 150 - 450 10e9/L    Diff Method Automated Method     % Neutrophils 65.1 %    % Lymphocytes 26.6 %    % Monocytes 7.1 %    % Eosinophils 0.2 %    % Basophils 0.8 %    % Immature Granulocytes 0.2 %    Nucleated RBCs 0 0 /100    Absolute Neutrophil 3.5 1.6 - 8.3 10e9/L    Absolute Lymphocytes 1.4 0.8 - 5.3 10e9/L    Absolute Monocytes 0.4 0.0 - 1.3 10e9/L    Absolute Eosinophils 0.0 0.0 - 0.7 10e9/L    Absolute Basophils 0.0 0.0 - 0.2 10e9/L    Abs Immature Granulocytes 0.0 0 - 0.4 10e9/L    Absolute Nucleated RBC 0.0    Comprehensive metabolic panel   Result Value Ref Range    Sodium 141 133 - 144 mmol/L    Potassium 3.4 3.4 - 5.3 mmol/L    Chloride 106 94 - 109 mmol/L    Carbon Dioxide 28 20 - 32 mmol/L    Anion Gap 7 3 - 14 mmol/L    Glucose 100 (H) 70 - 99 mg/dL    Urea Nitrogen 12 7 - 30 mg/dL    Creatinine 0.82 0.52 - 1.04 mg/dL    GFR Estimate 86 >60 mL/min/[1.73_m2]    GFR Estimate If Black >90 >60 mL/min/[1.73_m2]    Calcium 9.1 8.5 - 10.1 mg/dL    Bilirubin Total 0.7 0.2 - 1.3 mg/dL    Albumin 3.9 3.4 - 5.0 g/dL    Protein Total 6.8 6.8 - 8.8 g/dL    Alkaline Phosphatase 102 40 - 150 U/L    ALT 23 0 - 50 U/L    AST 17 0 - 45 U/L   HCG qualitative Blood   Result Value Ref Range    HCG Qualitative Serum Negative NEG^Negative   Asymptomatic SARS-CoV-2 COVID-19 Virus (Coronavirus) by PCR    Specimen: Nasopharyngeal   Result Value Ref Range    SARS-CoV-2 Virus Specimen Source Nasopharyngeal     SARS-CoV-2 PCR Result NEGATIVE     SARS-CoV-2 PCR  Comment (Note)    UA reflex to Microscopic   Result Value Ref Range    Color Urine Yellow     Appearance Urine Slightly Cloudy     Glucose Urine Negative NEG^Negative mg/dL    Bilirubin Urine Negative NEG^Negative    Ketones Urine 20 (A) NEG^Negative mg/dL    Specific Gravity Urine 1.023 1.003 - 1.035    Blood Urine Negative NEG^Negative    pH Urine 5.0 5.0 - 7.0 pH    Protein Albumin Urine 30 (A) NEG^Negative mg/dL    Urobilinogen mg/dL 2.0 0.0 - 2.0 mg/dL    Nitrite Urine Negative NEG^Negative    Leukocyte Esterase Urine Negative NEG^Negative    Source Unspecified Urine     RBC Urine 1 0 - 2 /HPF    WBC Urine 3 0 - 5 /HPF    Bacteria Urine Few (A) NEG^Negative /HPF    Squamous Epithelial /HPF Urine 3 (H) 0 - 1 /HPF    Mucous Urine Present (A) NEG^Negative /LPF   Urine Drugs of Abuse Screen Panel 13   Result Value Ref Range    Cannabinoids (88-sys-7-carboxy-9-THC) Not Detected NDET^Not Detected ng/mL    Phencyclidine (Phencyclidine) Not Detected NDET^Not Detected ng/mL    Cocaine (Benzoylecgonine) Not Detected NDET^Not Detected ng/mL    Methamphetamine (d-Methamphetamine) Not Detected NDET^Not Detected ng/mL    Opiates (Morphine) Not Detected NDET^Not Detected ng/mL    Amphetamine (d-Amphetamine) Not Detected NDET^Not Detected ng/mL    Benzodiazepines (Nordiazepam) Not Detected NDET^Not Detected ng/mL    Tricyclic Antidepressants (Desipramine) Not Detected NDET^Not Detected ng/mL    Methadone (Methadone) Not Detected NDET^Not Detected ng/mL    Barbiturates (Butalbital) Not Detected NDET^Not Detected ng/mL    Oxycodone (Oxycodone) Not Detected NDET^Not Detected ng/mL    Propoxyphene (Norpropoxyphene) Not Detected NDET^Not Detected ng/mL    Buprenorphine (Buprenorphine) Not Detected NDET^Not Detected ng/mL       Medications - No data to display     This 45-year-old female who comes in with paranoia and some suicidal statements.  Patient apparently is been noncompliant with her medications recently.  Patient was  aggressive in the ambulance and was given Haldol and Benadryl prior to arrival.  Upon arrival here she is very sleepy and seems somewhat disoriented.  We initially allowed her to sleep with monitoring until we can get a better assessment on her.  After about 6 hours of being here she is little bit more awake and able to talk to the mental health 's.  We had the DEC talked to the patient and after talking to her and family feels that the patient is not safe to go home and is recommending inpatient evaluation.  Patient is on a health officer hold and will be transition to a 72-hour hold and transfer to inpatient psych when a bed becomes available.  Patient is medically stable and safe to be transferred at this time    Assessments & Plan (with Medical Decision Making)   paranoia, suicidal ideation     I have reviewed the nursing notes.    I have reviewed the findings, diagnosis, plan and need for follow up with the patient.              5/9/2021   [facility] EMERGENCY DEPT     Noe Grimes MD  05/09/21 [id number]

## 2021-05-09 NOTE — ED NOTES
"Spoke with daughter Cielo. Updated her. States her mom has not been this bad before. \"Usually she just get angry, she was really out of it\". She is aware her mom can have one visitor and may come her to check in.  "

## 2021-05-10 ENCOUNTER — HOSPITAL ENCOUNTER (INPATIENT)
Facility: CLINIC | Age: 46
LOS: 8 days | Discharge: HOME | DRG: 885 | End: 2021-05-18
Attending: PSYCHIATRY & NEUROLOGY | Admitting: PSYCHIATRY & NEUROLOGY
Payer: MEDICARE

## 2021-05-10 VITALS
SYSTOLIC BLOOD PRESSURE: 138 MMHG | TEMPERATURE: 97.8 F | OXYGEN SATURATION: 99 % | RESPIRATION RATE: 20 BRPM | HEART RATE: 96 BPM | DIASTOLIC BLOOD PRESSURE: 107 MMHG

## 2021-05-10 DIAGNOSIS — R11.0 NAUSEA: ICD-10-CM

## 2021-05-10 DIAGNOSIS — F31.9 BIPOLAR 1 DISORDER (H): Primary | ICD-10-CM

## 2021-05-10 DIAGNOSIS — B00.9 HERPES SIMPLEX VIRUS INFECTION: ICD-10-CM

## 2021-05-10 DIAGNOSIS — I47.10 SUPRAVENTRICULAR TACHYCARDIA (H): ICD-10-CM

## 2021-05-10 PROBLEM — R45.851 SUICIDAL IDEATION: Status: ACTIVE | Noted: 2021-05-10

## 2021-05-10 LAB
CHOLEST SERPL-MCNC: 185 MG/DL
HDLC SERPL-MCNC: 67 MG/DL
LDLC SERPL CALC-MCNC: 107 MG/DL
NONHDLC SERPL-MCNC: 118 MG/DL
TRIGL SERPL-MCNC: 57 MG/DL
TSH SERPL DL<=0.005 MIU/L-ACNC: 0.76 MU/L (ref 0.4–4)

## 2021-05-10 PROCEDURE — G0177 OPPS/PHP; TRAIN & EDUC SERV: HCPCS

## 2021-05-10 PROCEDURE — 84443 ASSAY THYROID STIM HORMONE: CPT | Performed by: PSYCHIATRY & NEUROLOGY

## 2021-05-10 PROCEDURE — 124N000002 HC R&B MH UMMC

## 2021-05-10 PROCEDURE — 250N000013 HC RX MED GY IP 250 OP 250 PS 637: Performed by: NURSE PRACTITIONER

## 2021-05-10 PROCEDURE — 99222 1ST HOSP IP/OBS MODERATE 55: CPT | Performed by: PHYSICIAN ASSISTANT

## 2021-05-10 PROCEDURE — 36415 COLL VENOUS BLD VENIPUNCTURE: CPT | Performed by: PSYCHIATRY & NEUROLOGY

## 2021-05-10 PROCEDURE — 80061 LIPID PANEL: CPT | Performed by: PSYCHIATRY & NEUROLOGY

## 2021-05-10 PROCEDURE — 99207 PR CONSULT E&M CHANGED TO INITIAL LEVEL: CPT | Performed by: PHYSICIAN ASSISTANT

## 2021-05-10 PROCEDURE — 99223 1ST HOSP IP/OBS HIGH 75: CPT | Mod: AI | Performed by: NURSE PRACTITIONER

## 2021-05-10 RX ORDER — FLECAINIDE ACETATE 100 MG/1
100 TABLET ORAL 2 TIMES DAILY
Status: DISCONTINUED | OUTPATIENT
Start: 2021-05-10 | End: 2021-05-18 | Stop reason: HOSPADM

## 2021-05-10 RX ORDER — OLANZAPINE 10 MG/2ML
10 INJECTION, POWDER, FOR SOLUTION INTRAMUSCULAR 3 TIMES DAILY PRN
Status: DISCONTINUED | OUTPATIENT
Start: 2021-05-10 | End: 2021-05-18 | Stop reason: HOSPADM

## 2021-05-10 RX ORDER — ACYCLOVIR 200 MG/1
200 CAPSULE ORAL
Status: ON HOLD | COMMUNITY
End: 2021-05-17

## 2021-05-10 RX ORDER — OLANZAPINE 10 MG/1
10 TABLET ORAL 3 TIMES DAILY PRN
Status: DISCONTINUED | OUTPATIENT
Start: 2021-05-10 | End: 2021-05-10

## 2021-05-10 RX ORDER — AMOXICILLIN 250 MG
1 CAPSULE ORAL 2 TIMES DAILY PRN
Status: DISCONTINUED | OUTPATIENT
Start: 2021-05-10 | End: 2021-05-18 | Stop reason: HOSPADM

## 2021-05-10 RX ORDER — OLANZAPINE 5 MG/1
5-10 TABLET ORAL 3 TIMES DAILY PRN
Status: DISCONTINUED | OUTPATIENT
Start: 2021-05-10 | End: 2021-05-18 | Stop reason: HOSPADM

## 2021-05-10 RX ORDER — HYDROXYZINE HYDROCHLORIDE 25 MG/1
25 TABLET, FILM COATED ORAL EVERY 4 HOURS PRN
Status: DISCONTINUED | OUTPATIENT
Start: 2021-05-10 | End: 2021-05-10

## 2021-05-10 RX ORDER — MAGNESIUM HYDROXIDE/ALUMINUM HYDROXICE/SIMETHICONE 120; 1200; 1200 MG/30ML; MG/30ML; MG/30ML
30 SUSPENSION ORAL EVERY 4 HOURS PRN
Status: DISCONTINUED | OUTPATIENT
Start: 2021-05-10 | End: 2021-05-18 | Stop reason: HOSPADM

## 2021-05-10 RX ORDER — TRAZODONE HYDROCHLORIDE 50 MG/1
50 TABLET, FILM COATED ORAL
Status: DISCONTINUED | OUTPATIENT
Start: 2021-05-10 | End: 2021-05-10

## 2021-05-10 RX ORDER — HYDROXYZINE HYDROCHLORIDE 25 MG/1
25-50 TABLET, FILM COATED ORAL EVERY 4 HOURS PRN
Status: DISCONTINUED | OUTPATIENT
Start: 2021-05-10 | End: 2021-05-12

## 2021-05-10 RX ORDER — FLECAINIDE ACETATE 100 MG/1
100 TABLET ORAL EVERY 12 HOURS
Status: ON HOLD | COMMUNITY
End: 2021-05-17

## 2021-05-10 RX ORDER — LANOLIN ALCOHOL/MO/W.PET/CERES
3 CREAM (GRAM) TOPICAL
Status: DISCONTINUED | OUTPATIENT
Start: 2021-05-10 | End: 2021-05-18 | Stop reason: HOSPADM

## 2021-05-10 RX ORDER — LAMOTRIGINE 25 MG/1
25 TABLET ORAL EVERY EVENING
Status: DISCONTINUED | OUTPATIENT
Start: 2021-05-10 | End: 2021-05-18 | Stop reason: HOSPADM

## 2021-05-10 RX ORDER — ONDANSETRON 4 MG/1
4 TABLET, ORALLY DISINTEGRATING ORAL EVERY 6 HOURS PRN
Status: DISCONTINUED | OUTPATIENT
Start: 2021-05-10 | End: 2021-05-18 | Stop reason: HOSPADM

## 2021-05-10 RX ORDER — ACETAMINOPHEN 325 MG/1
650 TABLET ORAL EVERY 4 HOURS PRN
Status: DISCONTINUED | OUTPATIENT
Start: 2021-05-10 | End: 2021-05-18 | Stop reason: HOSPADM

## 2021-05-10 RX ORDER — OLANZAPINE 10 MG/2ML
10 INJECTION, POWDER, FOR SOLUTION INTRAMUSCULAR 3 TIMES DAILY PRN
Status: DISCONTINUED | OUTPATIENT
Start: 2021-05-10 | End: 2021-05-10

## 2021-05-10 RX ADMIN — FLECAINIDE ACETATE 100 MG: 100 TABLET ORAL at 12:24

## 2021-05-10 RX ADMIN — LAMOTRIGINE 25 MG: 25 TABLET ORAL at 20:42

## 2021-05-10 RX ADMIN — FLECAINIDE ACETATE 100 MG: 100 TABLET ORAL at 20:42

## 2021-05-10 ASSESSMENT — ACTIVITIES OF DAILY LIVING (ADL)
ADL_ASSESSMENT: WDL
WEAR_GLASSES_OR_BLIND: YES
FALL_HISTORY_WITHIN_LAST_SIX_MONTHS: YES
TOILETING_ISSUES: NO
HYGIENE/GROOMING: INDEPENDENT
DOING_ERRANDS_INDEPENDENTLY_DIFFICULTY: NO
NUMBER_OF_TIMES_PATIENT_HAS_FALLEN_WITHIN_LAST_SIX_MONTHS: 2
ORAL_HYGIENE: INDEPENDENT
DIFFICULTY_COMMUNICATING: NO
CONCENTRATING,_REMEMBERING_OR_MAKING_DECISIONS_DIFFICULTY: YES
LAUNDRY: WITH SUPERVISION
WALKING_OR_CLIMBING_STAIRS_DIFFICULTY: NO
ORAL_HYGIENE: INDEPENDENT
DRESS: INDEPENDENT;SCRUBS (BEHAVIORAL HEALTH)
HEARING_DIFFICULTY_OR_DEAF: NO
DIFFICULTY_EATING/SWALLOWING: YES
HYGIENE/GROOMING: HANDWASHING;INDEPENDENT
VISION_MANAGEMENT: GLASSES
DRESSING/BATHING_DIFFICULTY: NO
PATIENT_/_FAMILY_COMMUNICATION_STYLE: SPOKEN LANGUAGE (ENGLISH OR BILINGUAL)
LAUNDRY: WITH SUPERVISION
DRESS: STREET CLOTHES;SCRUBS (BEHAVIORAL HEALTH);INDEPENDENT

## 2021-05-10 NOTE — PHARMACY-ADMISSION MEDICATION HISTORY
Admission Medication History Completed by Pharmacy    See [facility] Admission Navigator for allergy information, preferred outpatient pharmacy, prior to admission medications and immunization status.     Medication History Sources:     [facility](fill history) and patient interview (via telephone)    Changes made to PTA medication list (reason):    Added: all per fill history, confirmed with patient report    Deleted: None    Changed: None    Additional Information:    Lamotrigine - Patient reported taking lamotrigine in the past (last filled back in December 2020). Patient reported she was at a dose of 300 mg, but felt a dose of 100 mg provided best results for her.    Prior to Admission medications    Medication Sig Last Dose Taking? Auth Provider   flecainide (TAMBOCOR) 100 MG tablet Take 100 mg by mouth every 12 hours 5/6/2021 Yes Unknown, Entered By History   tiZANidine (ZANAFLEX) 4 MG tablet Take 4 mg by mouth 3 times daily as needed for muscle spasms Past Month at Unknown time Yes Unknown, Entered By History   acyclovir (ZOVIRAX) 200 MG capsule Take 200 mg by mouth 5 times daily  More than a month at Unknown time  Unknown, Entered By History       Date completed: 05/10/21    Medication history completed by:     Nicollette McMann, PharmD  [facility] Building: [facility] *[id number]  
[phone]
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
## 2021-05-10 NOTE — CONSULTS
[facility]  Internal Medicine Consult     Brittany Durbin MRN# [id number]   Age: 45 year old YOB: 1975     Date of Admission: 5/10/2021  Date of Consult: 5/10/2021    Primary Care Provider: Selvin Dempsey Ob    Requesting Service: Behavioral Health - Darius Alaniz MD  Reason for Consult: General Medical Evaluation      SUBJECTIVE   CC:   H/o arrhythmia   Assessment and Plan/Recommendations:   Brittany Durbin is a 45 year old female with history of SVT s/p ablation 2007, depression, obesity, and chronic pain who was admitted to station 3A with SI. Of note, patient has two medical charts which are scheduled to be merged, see MRN [id number] for other information. Patient carries several medical diagnoses in OSH records that are linked to her other account. It is difficult to sort out which diagnoses are accurate and which may not be based on OSH provider documentation, two medical charts, and patient current psychiatric state. The following is my best summarization of current/active medical conditions that may need to be addressed this admission. Patient should follow up OP with PCP for management of chronic, complex care      H/o paroxsymal SVT s/p ablation 2007: Had recurrence of palpitations starting 2018. Event monitor 2/2019 <1% PVCs, two episodes of SVT vs sinus tachycardia. Has been treated with Flecainide since then, but had issues with compliance due to unstable housing situation. Denies chest pain, dyspnea, palpitations, racing HR this admission. Follows OP with [facility]ology. RRR on exam 5/11  - Continue flecainide  - Needs OP with [facility] cardiology on discharge     Elevated BP: No PTA meds. BP 130s-160s/90s-130s in the setting of anxiety  - Hydralazine x1 and then prn   - Will continue to follow BP    Chronic pain: Of the neck and right shoulder. Continue Tizanidine. Follow up with OP pain clinic as indicated    H/o gastric bypass:  "Several years ago. No acute concerns, monitor       Medicine will continue to follow BP. Please contact if future questions or concerns arise. Thank you for the opportunity to be a part of this patient's care.      Alicia Blanco PA-C  Internal Medicine PAUL Hospitalist  Page job code [phone] (3A), or [phone] ([facility] and )  Text paging via [employer] is appreciated  May 10, 2021         HPI:   Brittany Durbin is a 45 year old female with history of SVT s/p ablation 2007, depression, obesity, and chronic pain who was admitted to station 3A with SI.     Patient reports h/o abnormal heart beat many years ago. She underwent ablation in 2007 and things got better. She then developed racing HR and was put in flecainide. She has had issues with access to this medication over the past several months as housing has been unstable. She currently denies dyspnea, chest pain, palpitations, or racing HR. She is very worried about the effects of \"all the medication I am on here.\" She feels slow and groggy. Per d/w psychiatry, current mental processing is c/w prior days. Patient is agreeable to continuing flecainide and will discuss her concerns with psychiatric mediations with psychiatry team. Denies recent illness, abdominal pain, N/V, urinary symptoms, change in bowels, or peripheral edema         Past Medical History:     Past Medical History:   Diagnosis Date     Bipolar affective disorder (H)     No Comments Provided     Cervical dysplasia     2001     Obesity     BMI 35     Other depressive disorder     No Comments Provided        Reviewed and updated in [facility].     Past Surgical History:      Past Surgical History:   Procedure Laterality Date     LAPAROSCOPIC BYPASS GASTRIC      No Comments Provided     LAPAROSCOPY DIAGNOSTIC (GENERAL)      1998     OTHER SURGICAL HISTORY      2003,98141.0,[place] LIGATE FALLOPIAN TUBE     RELEASE CARPAL TUNNEL      2006,left     RELEASE CARPAL TUNNEL      2004,right     " "SEPTOPLASTY      2005,turbinoplasty     TONSILLECTOMY      2005         Social History:     Social History     Tobacco Use     Smoking status: Current Every Day Smoker     Packs/day: 0.25     Years: 10.00     Pack years: 2.50     Types: Cigarettes     Last attempt to quit: 2002     Years since quittin.3     Smokeless tobacco: Never Used   Substance Use Topics     Alcohol use: No     Alcohol/week: 0.0 standard drinks     Drug use: Unknown     Types: Other     Comment: Drug use: No        Family History:     Family History   Problem Relation Age of Onset     Diabetes Sister         Diabetes     Hypertension Father         Hypertension     Heart Disease Sister         Heart Disease     Heart Disease Maternal Grandfather         Heart Disease     Other - See Comments Mother         bipolor         Allergies:     Allergies   Allergen Reactions     Aspirin      \"drug eruption\" Skin     Ceclor [Cefaclor]      \"drug eruption\"     Keflex [Cephalexin] Other (See Comments)     \"drug eruption\"     Paxil [Paroxetine] Other (See Comments)     \"feels not right\"     Zoloft [Sertraline] Other (See Comments)     Penicillins Rash     Prozac [Fluoxetine] Other (See Comments)     \"feels not right\"         Medications:   Reviewed. Please see MAR     Review of Systems:   10 point ROS of systems including Constitutional, Eyes, Respiratory, Cardiovascular, Gastroenterology, Genitourinary, Integumentary, Muscularskeletal, Psychiatric were all negative except for pertinent positives noted in my HPI.    OBJECTIVE   Physical Exam:   Vitals were reviewed  Blood pressure (!) 160/111, pulse 111, temperature 98.9  F (37.2  C), temperature source Oral, resp. rate 16, SpO2 97 %.  General: Alert and oriented x3, slow mental processing  HEENT: Anicteric sclera, MMM  Cardiovascular: RRR (rate 96), S1S2. No murmur noted  Lungs: CTAB without wheezing or crackles   GI: Abdomen soft, non-tender with bowel sounds present. No guarding or rebound "   Vascular: No peripheral edema, distal pulses palpable  Neurologic: No focal deficits, CN II-XII grossly intact  Skin: No jaundice, rashes, or lesions        Data:        Lab Results   Component Value Date     05/09/2021    Lab Results   Component Value Date    CHLORIDE 106 05/09/2021    Lab Results   Component Value Date    BUN 12 05/09/2021      Lab Results   Component Value Date    POTASSIUM 3.4 05/09/2021    Lab Results   Component Value Date    CO2 28 05/09/2021    Lab Results   Component Value Date    CR 0.82 05/09/2021        Lab Results   Component Value Date    WBC 5.3 05/09/2021    HGB 13.8 05/09/2021    HCT 41.4 05/09/2021    MCV 87 05/09/2021     05/09/2021     Lab Results   Component Value Date    WBC 5.3 05/09/2021

## 2021-05-10 NOTE — PLAN OF CARE
Nursing Assessment    Recent Vitals: B/P: 146/116, T: 98.9, P: 105, R: 17     Mental Status Exam:  Appearance:  Adequately groomed, Dressed appropriately for weather and Appears stated age  Behavior/relationship to examiner/demeanor:  Cooperative, Engaged and Pleasant  Affect (objective appearance):  Appropriate/mood-congruent and Tearful  Mood (subjective report):  pleasant, depressed, sad, overwhelmed and anxious  Gait:  Normal  Speech:  Normal    Abnormal Perception:  None  Attention/Concentration:  Normal,  Alert, Oriented to person, Oriented to place, Oriented to date/time and Oriented to situation  Insight:  Good  Judgment: Adequate for safety      General Shift Summary  Patient is tearful at times but pleasant, cooperative, and engaged.  She endorses both anxiety and depression.  She says that suicidal thoughts come and go.  She agrees to be safe while in the hospital.  She denies SIB and HI.  When asked about hallucinations she explains that she thinks she does not have hallucinations but then says that she has persistent thoughts, but thinks this is her subconscious.   Patient says that she wants to be their for her family and she has been having a hard time doing this lately.  Patient attends groups and engages with select staff and peers.  She spoke on phone to family today.  Patient is medication compliant.  BP elevated, provider notified.   Plan is to continue to monitor patient status q 15 mins, assess response to medications, and maintain the patients safety.    Irma Alatorre RN

## 2021-05-10 NOTE — PLAN OF CARE
BEHAVIORAL TEAM DISCUSSION    Participants: Mary Jane Aldridge CNP; Kathy Delaney and Mely Shepherd, SHAW's; Irma Alatorre RN  Progress: pt is a 44 yo female admitted to station 32 on a 72 HH due to SI with a plan.  Anticipated length of stay: TBD  Continued Stay Criteria/Rationale: pt needs further evaluation   Medical/Physical: uneventful  Precautions:   Behavioral Orders   Procedures     Code 1 - Restrict to Unit     Elopement precautions     Routine Programming     As clinically indicated     Self Injury Precaution     Status 15     Every 15 minutes.     Suicide precautions     Patients on Suicide Precautions should have a Combination Diet ordered that includes a Diet selection(s) AND a Behavioral Tray selection for Safe Tray - with utensils, or Safe Tray - NO utensils       Plan: pt will meet with psychiatry and CTC will address discharge planning.  Pt will be encouraged to attend unit programming.  Rationale for change in precautions or plan: new admission

## 2021-05-10 NOTE — PROGRESS NOTES
"SPIRITUAL HEALTH SERVICES   Spiritual Assessment Progress Note (Behavioral Health/CD Focus)  [facility] ([facility]) 32N    REFERRAL SOURCE: patient    On this visit, Brittany agreed to meet with me in the milieu.  She was tearful during the visit.    EXPERIENCE OF ILLNESS/HOSPITALIZATION:  Brittany described having a \"breakdown,\" and that she had \"had horrible thoughts\" about her grandchildren.  She shared that she \"always hurts the people I love,\" and described that she tries to love her family but \"not in the ways they want.\"  Brittany said she feels selfish when she takes care of her own needs, and that she wants to find a balance between giving of herself to her family and taking care of her needs.     MEANING-MAKING:  Brittany was receptive to the idea that being in the hospital was not selfish, that she was here because she wanted to support her family better.  She said she liked thinking of it that way, and I encouraged her to acknowledge that she was taking steps to get better and getting help.     SPIRITUALITY/VALUES/Caodaism:  Not discussed    COPING/SPIRITUAL PRACTICES: Brittany said that while she's been in the hospital her \"head feels clearer\" and she feels better today than she has been.      SUPPORT SYSTEMS: Brittany shared that her children and grandchildren are important to her and that she wants to continue to have a good relationship with them.  Also mentioned her father as a support system and that she has talked to him since arriving at the hospital.     PLAN: [facility] will continue to remain available for support                                                                                                                                   Loren Meyerlain  Pager: [phone]    "

## 2021-05-10 NOTE — PROGRESS NOTES
"[facility]  Occupational Therapy Behavioral Health Assessment    Patient Name: Brittany Durbin    Description: OT staff met with pt to review the role of occupational therapy and explain the value of having them involved in their treatment plan including options to meet current needs/self-identified goals. The below evaluation is a compilation of functional performance observation and information obtained from an OT self-assessment.     Pt identified \"a year or so: stress level, very high\" as the reason for the current hospitalization.    Pt endorsed an understanding of symptoms on function. Pt identified experiencing the following symptoms, selected from checklist provided:     Feelings - anxiety / fear, mood swings, abandoned, numb, depressed, overwhelmed     Thoughts - negative, racing, memory problems, trouble concentrating and making decisions,  nightmares, day-dreaming, obsessive, self-harm, suicidal, thoughts of harming others     Behaviors - problems with relationship(s),  compulsive behaviors, increased substance use, financial concerns / spending problems, procrastinating, self harm      Pt identifies as goals for this hospitalization:  Identify & express feelings  Look at how self-destructive patterns affects me (or others)  Learn to make choices and take action  Improve problem solving skills  Acceptance of _____  Improve focus/concentration  Manage anxiety  Manage anger  Improve time management  Manage stress  Improve skills to prevent relapse     Pt identified the following supports outside the hospital: family & friends    Pt identified the following personal strengths to support her recovery: [left blank]      Assessment: Patient would benefit from continue engagement in OT groups that support healthy recovery, specifically exploration of positive coping skills for symptom management/relapse prevention.    Plan: Initiate care plan goals and interventions.      Plan for Next Treatment: " Provide graded occupation-based activities for increased success and ongoing assessment.     Mj Dickinson, OT on 5/10/2021 at 2:58 PM       05/10/21 1400   General Information   Date Initially Attended OT 05/10/21   Clinical Impression   Affect Appropriate to situation   Orientation Oriented to person, place and time   Appearance and ADLs Neatly groomed   Attention to Internal Stimuli No observed signs   Interaction Skills Interacts appropriately with staff;Interacts appropriately with peers   Ability to Communicate Needs Independent;Does so with prompts   Verbal Content Clear;Appropriate to topic   Ability to Maintain Boundaries Maintains appropriate physical boundaries;Maintains appropriate verbal boundaries   Participation Participates with minimal encouragement   Concentration Concentrates 20-30 minutes   Ability to Concentrate With structure   Follows and Comprehends Directions Independently follows multi-step directions   Memory Needs further assessment   Organization Independently organizes medium tasks   Decision Making Independent   Planning and Problem Solving Independently plans ahead;Indentifies problems but not alternatives   Ability to Apply and Learn Concepts Applies within group structure   Frustrations / Stress Tolerance Independently identifies sources of frustration/stress;Independently identifies skills    Level of Insight Identifies needs with structure/support   Self Esteem Can identify positives;Takes risks with support and encouragement   Social Supports Needs further assessment

## 2021-05-10 NOTE — PROGRESS NOTES
"Pt admitted from [facility] on 72 HH,with suicidal ideation, plan was to overdose or shoot herself, says she does have a gun at her house. Pt appeared sad and depressed,cooperative with admission process, gave minimal responses. Denies alcohol or illicit drug abuse, says she does sometimes smoke a \"little marijuana,\" utox was negative and covid nasal pharyngel  negative.     Pt denies auditory or visual hallucinations, says she did stop taking all psych medications, stated  \"I feel better without them.\" Pt also said she was allergic to Zoloft, Prozac, and Paxil said she didn't feel right, felt \"wired/amped up  Nerves.\" Pt also said that certain antibiotics and aspirin caused her to have \"drug eruption,\" per her doctor.    Pt denies suicidal ideation,SIB at this time, on Status 15 minute checks, has slept approximately 2.5 hours.  "
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
## 2021-05-11 PROCEDURE — H2032 ACTIVITY THERAPY, PER 15 MIN: HCPCS

## 2021-05-11 PROCEDURE — 250N000013 HC RX MED GY IP 250 OP 250 PS 637: Performed by: PHYSICIAN ASSISTANT

## 2021-05-11 PROCEDURE — G0177 OPPS/PHP; TRAIN & EDUC SERV: HCPCS

## 2021-05-11 PROCEDURE — 250N000013 HC RX MED GY IP 250 OP 250 PS 637: Performed by: NURSE PRACTITIONER

## 2021-05-11 PROCEDURE — 99232 SBSQ HOSP IP/OBS MODERATE 35: CPT | Performed by: NURSE PRACTITIONER

## 2021-05-11 PROCEDURE — 124N000002 HC R&B MH UMMC

## 2021-05-11 RX ORDER — RISPERIDONE 0.5 MG/1
0.5 TABLET ORAL AT BEDTIME
Status: DISCONTINUED | OUTPATIENT
Start: 2021-05-11 | End: 2021-05-12

## 2021-05-11 RX ORDER — RISPERIDONE 0.5 MG/1
0.5 TABLET ORAL 3 TIMES DAILY PRN
Status: DISCONTINUED | OUTPATIENT
Start: 2021-05-11 | End: 2021-05-18 | Stop reason: HOSPADM

## 2021-05-11 RX ADMIN — FLECAINIDE ACETATE 100 MG: 100 TABLET ORAL at 21:50

## 2021-05-11 RX ADMIN — HYDROXYZINE HYDROCHLORIDE 25 MG: 25 TABLET, FILM COATED ORAL at 09:45

## 2021-05-11 RX ADMIN — RISPERIDONE 0.5 MG: 0.5 TABLET ORAL at 21:50

## 2021-05-11 RX ADMIN — Medication 12.5 MG: at 11:06

## 2021-05-11 RX ADMIN — OLANZAPINE 10 MG: 5 TABLET, FILM COATED ORAL at 18:06

## 2021-05-11 RX ADMIN — LAMOTRIGINE 25 MG: 25 TABLET ORAL at 21:50

## 2021-05-11 RX ADMIN — HYDROXYZINE HYDROCHLORIDE 25 MG: 25 TABLET, FILM COATED ORAL at 04:45

## 2021-05-11 RX ADMIN — FLECAINIDE ACETATE 100 MG: 100 TABLET ORAL at 07:58

## 2021-05-11 RX ADMIN — RISPERIDONE 0.5 MG: 0.5 TABLET ORAL at 12:35

## 2021-05-11 ASSESSMENT — ACTIVITIES OF DAILY LIVING (ADL)
HYGIENE/GROOMING: INDEPENDENT
LAUNDRY: WITH SUPERVISION
ORAL_HYGIENE: INDEPENDENT
DRESS: SCRUBS (BEHAVIORAL HEALTH)
ORAL_HYGIENE: INDEPENDENT
HYGIENE/GROOMING: INDEPENDENT
DRESS: SCRUBS (BEHAVIORAL HEALTH)
LAUNDRY: WITH SUPERVISION

## 2021-05-11 NOTE — PLAN OF CARE
Patient slept 6 hours during the overnight shift. Currently in her room sleeping. Nursing will continue to monitor.

## 2021-05-11 NOTE — PLAN OF CARE
At 0445, patient requested and received PRN hydroxyzine for anxiety (see MAR) and then went back to her room and laid down. No other issues or concerns noted at that time. Will continue to monitor.

## 2021-05-11 NOTE — PLAN OF CARE
Attended 1 OT [employer] x 45 minutes with 4 peers. Decisive with familiar task. Easily distracted requiring encouragement, reorientation and redirection to task. Able to follow 2 step verbal directions with good results. No social interaction with peers. .

## 2021-05-11 NOTE — PLAN OF CARE
Patient out in milieu majority of shift, interacting appropriately with staff and peers.   Pt endorses thoughts of self harm at times, but has appeared calm and bright at times this shift.  Pt reports not having much sleep last night due to late admission; therefore, retired early.

## 2021-05-11 NOTE — PLAN OF CARE
CTC NOTE      Work Completed: reviewed chart and remotely attended team discussion.  Communicated impressions of pt from interview yesterday.    Pt met with  and appeared to make good use of this contact.  She attended OT as well.  Discharge plan or goal: pt indicated she was ok with continuing to see current providers.  Stabilize and discharge to OP LOC                Barriers to discharge: acuity of mental health sxs

## 2021-05-11 NOTE — PROGRESS NOTES
"[facility],  Psychiatric Progress Note      Impression:     Brittany Durbin is a 45-year-old female admitted to [facility] on 5/10/2021.  She was admitted on a 72-hour hold through [facility], brought in via EMS due to suicidal ideation with a plan to overdose or shoot herself.  She does have access to guns.  She was visiting family and disclosed her suicidal ideation.  She was physically aggressive and was given Haldol & Benadryl by EMS.  Family reported that she was having paranoid thoughts that her boyfriend was taking her money and people were listening to her phone conversations.  She had smoked marijuana a few days prior to admission but states she does not do so regularly.  She most recently consumed alcohol about a week prior to admission.  She reports her drinking \"goes in spurts.\"  UTOX was negative.  She stopped taking Lamictal about a month prior to admission.  She says she stopped taking it due to nausea, though continues to have intermittent nausea \"with certain food.\"  Since admission, Lamictal and Risperdal were initiated.  PRNs of Risperdal, Melatonin, Hydroxyzine and Zyprexa were also initiated.  She continues to have paranoid, delusional thought content and suicidal ideation.  She contracts for safety on the unit.         Diagnoses:     Bipolar disorder, unspecified  PTSD  Alcohol use disorder  History of gastric bypass  Jeff-Parkinson-White syndrome, status post cardiac ablation  History of supraventricular tachycardia  Hypertension         Plan:     Medications:  Continue Lamictal titration.  Begin Risperdal 0.5 mg at HS and 0.5 mg PRN.  Continue PRNs of Hydroxyzine, Melatonin and Zyprexa.     Discharge to home when stable.  She has outpatient therapy, psychiatry and [facility].  She will need to follow up with cardiology at [facility].        Attestation:  Patient has been seen and evaluated by me, Teresa Mathur " "LELAND Aldridge CNP  The patient was counseled on nature of illness and treatment plan/options  Care was coordinated with treatment team         Clinical Global Impressions  First:  Considering your total clinical experience with this particular patient population, how severe are the patient's symptoms at this time?: 6 (05/10/21 1643)  Compared to the patient's condition at the START of treatment, this patient's condition is: 4 (05/10/21 1643)  Most recent:  Considering your total clinical experience with this particular patient population, how severe are the patient's symptoms at this time?: 6 (05/10/21 1643)  Compared to the patient's condition at the START of treatment, this patient's condition is: 4 (05/10/21 1643)             Interim History:     The patient's care was discussed with the treatment team and chart notes were reviewed.  Pt was documented as sleeping 6 hours during the overnight shift.  She has been attending groups and spending some time in the milieu.  She has been tearful at times.  She agreed to sign in voluntarily today.  Pt reports feeling worse today.  \"The thoughts are coming back.  Everything is scrambled up.  I'm fine one minute and not the next.  Everything I speak seems to come true.\"  She feels as if someone is controlling her mind.  She is fearful that medications are \"a placebo to make me feel crazy.\"  She is concerned about electronics being hacked.  She denies hallucinations.  She reports that she has difficulty concentrating and is slow to process information.  She has memory impairment.  She feels anxious and depressed.  She reports suicidal ideation without intent or plan.  She was tearful during the conversation.  She reports increased urinary frequency which has happened in the past with UTIs.  UA/UC ordered.           Medications:     Current Facility-Administered Medications   Medication     acetaminophen (TYLENOL) tablet 650 mg     alum & mag hydroxide-simethicone (MAALOX) " "suspension 30 mL     flecainide (TAMBOCOR) tablet 100 mg     hydrALAZINE (APRESOLINE) half-tab 12.5 mg     hydrOXYzine (ATARAX) tablet 25-50 mg     lamoTRIgine (LaMICtal) tablet 25 mg     melatonin tablet 3 mg     OLANZapine (zyPREXA) tablet 5-10 mg    Or     OLANZapine (zyPREXA) injection 10 mg     ondansetron (ZOFRAN-ODT) ODT tab 4 mg     risperiDONE (risperDAL) tablet 0.5 mg     risperiDONE (risperDAL) tablet 0.5 mg     senna-docusate (SENOKOT-S/PERICOLACE) 8.6-50 MG per tablet 1 tablet     tiZANidine (ZANAFLEX) tablet 4 mg             Allergies:     Allergies   Allergen Reactions     Aspirin      \"drug eruption\" Skin     Ceclor [Cefaclor]      \"drug eruption\"     Keflex [Cephalexin] Other (See Comments)     \"drug eruption\"     Paxil [Paroxetine] Other (See Comments)     \"feels not right\"     Zoloft [Sertraline] Other (See Comments)     Penicillins Rash     Prozac [Fluoxetine] Other (See Comments)     \"feels not right\"            Psychiatric Examination:     BP (!) 141/90   Pulse 74   Temp 97.9  F (36.6  C) (Oral)   Resp 16   SpO2 97%     Appearance:  awake, alert and adequately groomed  Attitude:  cooperative  Eye Contact:  fair  Mood:  anxious, depressed  Affect:  tearful   Speech:  clear, coherent, rather slow  Psychomotor Behavior:  no evidence of tardive dyskinesia, dystonia, or tics, appears restless, slowly pacing  Thought Process:  moderately disorganized  Associations:  no loose associations  Thought Content:  denies homicidal ideation, reports passive suicidal ideation and contracts for safety on the unit, denies hallucinations, paranoid/delusional thought content is present  Insight:  partial  Judgment:  fair  Oriented to:  person, month/year, time, place  Attention Span and Concentration:  limited  Recent and Remote Memory:  short-term and long-term impairment  Language:  intact, fluent English  Fund of Knowledge:  appropriate  Muscle Strength and Tone:  normal  Gait and Station:  normal         " Labs:     No results found for this or any previous visit (from the past 24 hour(s)).

## 2021-05-11 NOTE — PLAN OF CARE
"Patient had a very blunted/flat affect today. She was given Hydroxyzine prn again this morning for anxiety. Patient said later, \"I don't think it helped, I think it did the opposite.\" Patient answered \"I don't know\" to anxiety level question. Patient also answered \"I don't know\" to whether she had suicidal thoughts and thoughts of self-harm. Patient talked very slowly. She did go to groups this morning. Patient appears depressed and disconnected. Will continue to monitor and offer support.  Patient had high bp and pulse this AM. One time dose of Hydralazine was given per internal medicine. Will recheck vitals once medication has  time to kick in.  Bp and pulse at 1200 Blood pressure (!) 139/99, pulse 102, temperature 97.8  F (36.6  C), resp. rate 16, weight 81.3 kg (179 lb 4.8 oz), SpO2 97 %.    "

## 2021-05-12 LAB
ALBUMIN UR-MCNC: NEGATIVE MG/DL
APPEARANCE UR: CLEAR
BACTERIA #/AREA URNS HPF: ABNORMAL /HPF
BILIRUB UR QL STRIP: NEGATIVE
COLOR UR AUTO: ABNORMAL
GLUCOSE UR STRIP-MCNC: NEGATIVE MG/DL
HGB UR QL STRIP: NEGATIVE
KETONES UR STRIP-MCNC: NEGATIVE MG/DL
LEUKOCYTE ESTERASE UR QL STRIP: NEGATIVE
NITRATE UR QL: NEGATIVE
PH UR STRIP: 6 PH (ref 5–7)
RBC #/AREA URNS AUTO: <1 /HPF (ref 0–2)
SOURCE: ABNORMAL
SP GR UR STRIP: 1.01 (ref 1–1.03)
SQUAMOUS #/AREA URNS AUTO: 1 /HPF (ref 0–1)
UROBILINOGEN UR STRIP-MCNC: NORMAL MG/DL (ref 0–2)
WBC #/AREA URNS AUTO: <1 /HPF (ref 0–5)

## 2021-05-12 PROCEDURE — 99232 SBSQ HOSP IP/OBS MODERATE 35: CPT | Performed by: NURSE PRACTITIONER

## 2021-05-12 PROCEDURE — G0177 OPPS/PHP; TRAIN & EDUC SERV: HCPCS

## 2021-05-12 PROCEDURE — 81001 URINALYSIS AUTO W/SCOPE: CPT | Performed by: NURSE PRACTITIONER

## 2021-05-12 PROCEDURE — 124N000002 HC R&B MH UMMC

## 2021-05-12 PROCEDURE — 250N000013 HC RX MED GY IP 250 OP 250 PS 637: Performed by: NURSE PRACTITIONER

## 2021-05-12 RX ORDER — LORAZEPAM 0.5 MG/1
0.5 TABLET ORAL EVERY 4 HOURS PRN
Status: DISCONTINUED | OUTPATIENT
Start: 2021-05-12 | End: 2021-05-18 | Stop reason: HOSPADM

## 2021-05-12 RX ORDER — LORAZEPAM 0.5 MG/1
0.5 TABLET ORAL 3 TIMES DAILY
Status: DISCONTINUED | OUTPATIENT
Start: 2021-05-12 | End: 2021-05-14

## 2021-05-12 RX ORDER — RISPERIDONE 1 MG/1
1 TABLET ORAL AT BEDTIME
Status: DISCONTINUED | OUTPATIENT
Start: 2021-05-12 | End: 2021-05-14

## 2021-05-12 RX ADMIN — LAMOTRIGINE 25 MG: 25 TABLET ORAL at 21:35

## 2021-05-12 RX ADMIN — FLECAINIDE ACETATE 100 MG: 100 TABLET ORAL at 21:35

## 2021-05-12 RX ADMIN — LORAZEPAM 0.5 MG: 0.5 TABLET ORAL at 10:37

## 2021-05-12 RX ADMIN — LORAZEPAM 0.5 MG: 0.5 TABLET ORAL at 21:34

## 2021-05-12 RX ADMIN — RISPERIDONE 1 MG: 1 TABLET ORAL at 21:34

## 2021-05-12 RX ADMIN — MELATONIN TAB 3 MG 3 MG: 3 TAB at 22:58

## 2021-05-12 RX ADMIN — FLECAINIDE ACETATE 100 MG: 100 TABLET ORAL at 07:43

## 2021-05-12 RX ADMIN — LORAZEPAM 0.5 MG: 0.5 TABLET ORAL at 16:45

## 2021-05-12 ASSESSMENT — ACTIVITIES OF DAILY LIVING (ADL)
ORAL_HYGIENE: INDEPENDENT
LAUNDRY: WITH SUPERVISION
HYGIENE/GROOMING: INDEPENDENT
DRESS: INDEPENDENT;SCRUBS (BEHAVIORAL HEALTH)
DRESS: SCRUBS (BEHAVIORAL HEALTH)
ORAL_HYGIENE: INDEPENDENT
LAUNDRY: UNABLE TO COMPLETE
HYGIENE/GROOMING: INDEPENDENT

## 2021-05-12 NOTE — PLAN OF CARE
Patient slept 6.5 hours during the overnight shift; appeared comfortable with even and unlabored breathing through the night. No issues or concerns reported or observed. Nursing will continue to monitor.

## 2021-05-12 NOTE — PLAN OF CARE
"Patient is tearful and slow to respond with slow physical movements and indecisiveness (e.g. provided -requested-Release of Information for her children and after 15 minutes just handed it back). Patient asked to rate anxiety/depression and provided no oral response -just tears. Patient asked about leaving-explained 12 hour intent process - did not request at this time. Patient states \"I have to get home for my daughter's graduation on the 20 th.\"     Patient declined 2 PM scheduled ativan and agreed to revisit at 4 PM.    Patient denies SI/SIB urges/intent (unconvincingly with tears in eyes).    BP (!) 135/99   Pulse 83   Temp 98.7  F (37.1  C)   Resp 16   Wt 81.3 kg (179 lb 4.8 oz)   SpO2 99% . No c/o pain.    Nursing will continue to monitor.    "

## 2021-05-12 NOTE — PROGRESS NOTES
"Patient approached for hs medications and stated, \"I am fine without them.\" Continued to refused and be dismissive of encouragement to take them. Later approached med window and said, \"I am up I mine as well take them.\" Given hs medication.  B/p elevated 153/108 and refused recheck of blood pressure.  "

## 2021-05-12 NOTE — PROGRESS NOTES
"Patient med-compliant. Patient is slow moving and speaking slowly reporting \"nightmares last night that I don't remember and do not know if it happens everyday.\"Falls band applied and  UA cup provided.  "

## 2021-05-12 NOTE — PLAN OF CARE
"  Problem: Behavioral Health Plan of Care  Goal: Plan of Care Review  Outcome: No Change  Flowsheets (Taken 5/11/2021 1800)  Plan of Care Reviewed With: patient  Patient Agreement with Plan of Care: agrees     Problem: Behavioral Health Plan of Care  Goal: Optimized Coping Skills in Response to Life Stressors  Outcome: Declining     Problem: Behavioral Health Plan of Care  Goal: Develops/Participates in Therapeutic [place] to Support Successful Transition  Intervention: Foster Therapeutic [place]  Recent Flowsheet Documentation  Taken 5/11/2021 1800 by Maryellen Dukes RN  Trust Relationship/Rapport: care explained    Patient tearful and sobbing at times. Said she feels like she can't do or say anything right. Said, \"I am a fraud and a liar.\" Also said, \"I know I have hurt people in my life and I don't know how to fix it.\" Patient would not explain further what she meant by these statements. Also said \"I have a broken heart\" and \"I just want to die.\" Said she does not have a plan but \"If there was a way I would do it right now\"  and added that she is in the hospital and that there is nothing here to hurt herself with patient not garcía for safety. Prn zyprexa given. Provider notified of patient statements. Patient asked if she could take a shower and contracted for safety at that time. Mood labile, speech clear and affect blunted. Reminded urine is needed for lab. Continues on suicide, assault, SIB and elopement precautions.     "

## 2021-05-12 NOTE — PROGRESS NOTES
CLINICAL NUTRITION SERVICES - ASSESSMENT NOTE     Nutrition Prescription    RECOMMENDATIONS FOR MDs/PROVIDERS TO ORDER:  Recommend vitamin B12 based on history of gastric bypass    Malnutrition Status:    Unable to assess    Recommendations already ordered by Registered Dietitian (RD):  Continue current diet order    Future/Additional Recommendations:  Monitor intake, weight     REASON FOR ASSESSMENT  Brittany Durbin is a/an 45 year old female assessed by the dietitian for MST screen score significant for 2-13 lb weight loss and poor PO d/t decreased appetite.    NUTRITION HISTORY  - Unable to obtain nutrition history- pt not available at this time  - NKFA  Pt s/p laparoscopic gastric bypass 2013 and lost 115 lbs following procedure.     CURRENT NUTRITION ORDERS  Diet: Regular  Intake/Tolerance: intake is fine per RN    LABS  Labs reviewed    Electrolytes  Sodium (mmol/L)   Date Value   05/09/2021 141   08/22/2015 139   08/27/2012 142     Potassium (mmol/L)   Date Value   05/09/2021 3.4   08/22/2015 3.8   08/27/2012 3.9     No results found for: MAG, PHOS Renal  Urea Nitrogen (mg/dL)   Date Value   05/09/2021 12   08/22/2015 13   08/27/2012 10     Creatinine (mg/dL)   Date Value   05/09/2021 0.82   08/22/2015 0.94   08/27/2012 0.92     Inflammatory Markers  WBC (10e9/L)   Date Value   05/09/2021 5.3     Albumin (g/dL)   Date Value   05/09/2021 3.9   08/22/2015 4.0   08/27/2012 4.2      Blood Glucose  Glucose (mg/dL)   Date Value   05/09/2021 100 (H)   08/22/2015 91   08/27/2012 115 (H)    Hepatic  ALT (U/L)   Date Value   05/09/2021 23     AST (U/L)   Date Value   05/09/2021 17     Alkaline Phosphatase (U/L)   Date Value   05/09/2021 102     Bilirubin Total (mg/dL)   Date Value   05/09/2021 0.7    Additional  Triglycerides (mg/dL)   Date Value   05/10/2021 57     Ketones Urine (mg/dL)   Date Value   05/12/2021 Negative          MEDICATIONS    flecainide  100 mg Oral BID     lamoTRIgine  25 mg Oral QPM      LORazepam  0.5 mg Oral TID     risperiDONE  1 mg Oral At Bedtime        ANTHROPOMETRICS  Height: 0 cm (Data Unavailable)  Most Recent Weight: 81.3 kg (179 lb 4.8 oz)    IBW:   There is no height or weight on file to calculate BMI.  Weight History:   Wt Readings from Last 10 Encounters:   05/11/21 81.3 kg (179 lb 4.8 oz)   No additional al information available per Care Everywhere    Dosing Weight: Unable to assess needs at this time--pending additional history per patient    ASSESSED NUTRITION NEEDS--assess once more anthropometric data is available  Estimated Energy Needs:  (25 - 30 kcals/kg)  Justification: Maintenance  Estimated Protein Needs:  (0.8 - 1 grams of pro/kg)  Justification: Maintenance  Estimated Fluid Needs: (1 mL/kcal)   Justification: Maintenance    PHYSICAL FINDINGS  See malnutrition section below.    MALNUTRITION  % Intake: Unable to assess  % Weight Loss: Unable to assess  Subcutaneous Fat Loss: Unable to assess  Muscle Loss: Unable to assess  Fluid Accumulation/Edema: None noted  Malnutrition Diagnosis: Unable to determine    NUTRITION DIAGNOSIS  No nutrition diagnosis at this time     INTERVENTIONS  Implementation  Nutrition Education: Unable to complete due to pt unavailable     Goals  Patient to consume % of nutritionally adequate meal trays TID, or the equivalent with supplements/snacks.     Monitoring/Evaluation  Progress toward goals will be monitored and evaluated per protocol.  Yarelis Alvarado RD, LD  Unit Pager: [phone]  Weekend Pager: [phone]

## 2021-05-12 NOTE — PROGRESS NOTES
"[facility],  Psychiatric Progress Note      Impression:     Brittany Durbin is a 45-year-old female admitted to [facility] on 5/10/2021.  She was admitted on a 72-hour hold through [facility], brought in via EMS due to suicidal ideation with a plan to overdose or shoot herself.  She does have access to guns.  She was visiting family and disclosed her suicidal ideation.  She was physically aggressive and was given Haldol & Benadryl by EMS.  Family reported that she was having paranoid thoughts that her boyfriend was taking her money and people were listening to her phone conversations.  She had smoked marijuana a few days prior to admission but states she does not do so regularly.  She most recently consumed alcohol about a week prior to admission.  She reports her drinking \"goes in spurts.\"  UTOX was negative.  She stopped taking Lamictal about a month prior to admission.  She says she stopped taking it due to nausea, though continues to have intermittent nausea \"with certain food.\"  Since admission, Lamictal, Risperdal and Ativan were initiated.  PRNs of Risperdal, Melatonin, Ativan and Zyprexa were also initiated.  Speech and movements are rather slow.  She continues to have paranoid, delusional thought content and suicidal ideation.  She contracts for safety on the unit.           Diagnoses:     Bipolar disorder, unspecified  PTSD  Alcohol use disorder  History of gastric bypass  Jeff-Parkinson-White syndrome, status post cardiac ablation  History of supraventricular tachycardia  Hypertension         Plan:     Medications:  Continue Lamictal titration.  Increase Risperdal to 1 mg at HS.  Begin Ativan 0.5 mg TID.  Continue PRNs of Risperdal, Melatonin and Zyprexa.  Discontinue PRN Hydroxyzine and replace with PRN Ativan.     Discharge to home when stable.  She has outpatient therapy, psychiatry and [facility].  She will need to " "follow up with cardiology at [facility].        Attestation:  Patient has been seen and evaluated by me, Teresa Aldridge, APRN CNP  The patient was counseled on nature of illness and treatment plan/options  Care was coordinated with treatment team         Clinical Global Impressions  First:  Considering your total clinical experience with this particular patient population, how severe are the patient's symptoms at this time?: 6 (05/10/21 1643)  Compared to the patient's condition at the START of treatment, this patient's condition is: 4 (05/10/21 1643)  Most recent:  Considering your total clinical experience with this particular patient population, how severe are the patient's symptoms at this time?: 6 (05/10/21 1643)  Compared to the patient's condition at the START of treatment, this patient's condition is: 4 (05/10/21 1643)             Interim History:     The patient's care was discussed with the treatment team and chart notes were reviewed.  Pt was documented as sleeping 6.5 hours during the overnight shift.  She has been attending some groups.  She took PRN Hydroxyzine x 2, PRN Risperdal x 1 and PRN Zyprexa x 1 yesterday.  Staff report she has been rather hesitant to take medications.  She has been slow in her movements and speech.  During today's conversation her responses were delayed, speech was slow, and movements were also slow, though fairly continuous.  She said that she has difficulty formulating and communicating her thoughts.  \"I feel like I need to think a lot more before I say things.\"  Also reports that she is fearful that what she says, or even what she thinks, will come true.  \"It feels like psychological warfare but I'm doing it all on myself.   It's all in my head.  Even my thoughts are playing out.\"  When asked about her mood, she responded, \"I can't even explain it.\"  She reports passive suicidal ideation without intent/plan.  Appetite is good.  She denies hallucinations.  She denies " "urinary symptoms today and UA was unremarkable.          Medications:     Current Facility-Administered Medications   Medication     acetaminophen (TYLENOL) tablet 650 mg     alum & mag hydroxide-simethicone (MAALOX) suspension 30 mL     flecainide (TAMBOCOR) tablet 100 mg     hydrALAZINE (APRESOLINE) half-tab 12.5 mg     lamoTRIgine (LaMICtal) tablet 25 mg     LORazepam (ATIVAN) tablet 0.5 mg     LORazepam (ATIVAN) tablet 0.5 mg     melatonin tablet 3 mg     OLANZapine (zyPREXA) tablet 5-10 mg    Or     OLANZapine (zyPREXA) injection 10 mg     ondansetron (ZOFRAN-ODT) ODT tab 4 mg     risperiDONE (risperDAL) tablet 0.5 mg     risperiDONE (risperDAL) tablet 1 mg     senna-docusate (SENOKOT-S/PERICOLACE) 8.6-50 MG per tablet 1 tablet     tiZANidine (ZANAFLEX) tablet 4 mg             Allergies:     Allergies   Allergen Reactions     Aspirin      \"drug eruption\" Skin     Ceclor [Cefaclor]      \"drug eruption\"     Keflex [Cephalexin] Other (See Comments)     \"drug eruption\"     Paxil [Paroxetine] Other (See Comments)     \"feels not right\"     Zoloft [Sertraline] Other (See Comments)     Penicillins Rash     Prozac [Fluoxetine] Other (See Comments)     \"feels not right\"            Psychiatric Examination:     BP (!) 135/99   Pulse 83   Temp 98.7  F (37.1  C)   Resp 16   Wt 81.3 kg (179 lb 4.8 oz)   SpO2 99%     Appearance:  awake, alert and adequately groomed  Attitude:  somewhat guarded  Eye Contact:  minimal  Mood:  \"I can't even explain it\"  Affect:  blunted  Speech:  clear, coherent, rather slow, delays in responding  Psychomotor Behavior:  no evidence of tardive dyskinesia, dystonia, or tics, appears restless, slowly pacing  Thought Process:  moderately disorganized  Associations:  no loose associations  Thought Content:  denies homicidal ideation, reports passive suicidal ideation and contracts for safety on the unit, denies hallucinations, paranoid/delusional thought content is present  Insight:  " partial  Judgment:  fair  Oriented to:  person, month/year, time, place  Attention Span and Concentration:  limited  Recent and Remote Memory:  short-term and long-term impairment  Language:  intact, fluent English  Fund of Knowledge:  appropriate  Muscle Strength and Tone:  normal  Gait and Station:  normal         Labs:     Recent Results (from the past 24 hour(s))   UA with Microscopic reflex to Culture    Collection Time: 05/12/21  9:45 AM    Specimen: Urine   Result Value Ref Range    Color Urine Light Yellow     Appearance Urine Clear     Glucose Urine Negative NEG^Negative mg/dL    Bilirubin Urine Negative NEG^Negative    Ketones Urine Negative NEG^Negative mg/dL    Specific Gravity Urine 1.009 1.003 - 1.035    Blood Urine Negative NEG^Negative    pH Urine 6.0 5.0 - 7.0 pH    Protein Albumin Urine Negative NEG^Negative mg/dL    Urobilinogen mg/dL Normal 0.0 - 2.0 mg/dL    Nitrite Urine Negative NEG^Negative    Leukocyte Esterase Urine Negative NEG^Negative    Source Urine     WBC Urine <1 0 - 5 /HPF    RBC Urine <1 0 - 2 /HPF    Bacteria Urine Few (A) NEG^Negative /HPF    Squamous Epithelial /HPF Urine 1 0 - 1 /HPF

## 2021-05-12 NOTE — PLAN OF CARE
Attended 1 OT Creative Expression Group x 45 minutes with 2 peers present. Was attentive to task, however, had slowed movements and responses. No social interaction. Dull affect and mood. Superficial and guarded.

## 2021-05-12 NOTE — PLAN OF CARE
Pt actively participated in a structured Therapeutic Recreation group with a focus on leisure participation, stress reduction, and social engagement via an active group game. Pt remained focused and engaged throughout full duration of group.  Pt mood was calm and had a flat affect. Pt was fairly quiet, limited interactions with the others in the group. Pt was active in the tossing large, foam dice for the activity.

## 2021-05-12 NOTE — PROGRESS NOTES
Brief Medicine Note:    IM peripherally following BP which has overall improved since admission. Most recent /99 in the setting of ongoing psychiatric decompensation. Contact medicine if SBP spikes >170 or if BP consistently >140s/90s once psychiatrically stable. Medicine will sign off, please contact with future questions or concerns    Alicia Blanco PA-C  Internal Medicine [facility]  [phone]

## 2021-05-13 PROCEDURE — 250N000013 HC RX MED GY IP 250 OP 250 PS 637: Performed by: NURSE PRACTITIONER

## 2021-05-13 PROCEDURE — H2032 ACTIVITY THERAPY, PER 15 MIN: HCPCS

## 2021-05-13 PROCEDURE — 99232 SBSQ HOSP IP/OBS MODERATE 35: CPT | Performed by: NURSE PRACTITIONER

## 2021-05-13 PROCEDURE — 124N000002 HC R&B MH UMMC

## 2021-05-13 PROCEDURE — G0177 OPPS/PHP; TRAIN & EDUC SERV: HCPCS

## 2021-05-13 RX ADMIN — RISPERIDONE 1 MG: 1 TABLET ORAL at 21:38

## 2021-05-13 RX ADMIN — LORAZEPAM 0.5 MG: 0.5 TABLET ORAL at 08:50

## 2021-05-13 RX ADMIN — FLECAINIDE ACETATE 100 MG: 100 TABLET ORAL at 21:38

## 2021-05-13 RX ADMIN — LAMOTRIGINE 25 MG: 25 TABLET ORAL at 21:38

## 2021-05-13 RX ADMIN — MELATONIN TAB 3 MG 3 MG: 3 TAB at 21:41

## 2021-05-13 RX ADMIN — FLECAINIDE ACETATE 100 MG: 100 TABLET ORAL at 08:50

## 2021-05-13 RX ADMIN — LORAZEPAM 0.5 MG: 0.5 TABLET ORAL at 13:05

## 2021-05-13 RX ADMIN — LORAZEPAM 0.5 MG: 0.5 TABLET ORAL at 21:38

## 2021-05-13 ASSESSMENT — ACTIVITIES OF DAILY LIVING (ADL)
DRESS: INDEPENDENT;SCRUBS (BEHAVIORAL HEALTH)
HYGIENE/GROOMING: INDEPENDENT
LAUNDRY: WITH SUPERVISION
ORAL_HYGIENE: INDEPENDENT

## 2021-05-13 NOTE — PROGRESS NOTES
NOC Shift Report    Pt in bed at beginning of shift, breathing quiet and unlabored. Pt slept through shift. Pt slept 7.5 hours.     No pt complaints or concerns at this time.     No PRNs given. Will continue to monitor.

## 2021-05-13 NOTE — PLAN OF CARE
Work Completed:CTC met with pt who expressed concern that she still hasn't found her bifocal prescription glasses; these were apparently lost en route from the transferring hospital. Also discussed with pt her discharge date; pt expressed confusion about this. Discussed with pt that staying over the weekend would be preferable in order to assure that she is stabilizing and can engage in taking care of the house and other activities. Pt speech and responses were slow, she appeared to remain fixated on these two topics but had difficulty expressing self.     Discharge plan or goal: Discharge to home                Barriers to discharge: requires additional stabilization

## 2021-05-13 NOTE — PLAN OF CARE
CTC Note      Work Completed: reviewed chart and remotely attended team discussion.  AVS started and contact information located for her providers as plan is to follow up with established care.    Discharge plan or goal: stabilize and discharge to OP LOC                 Barriers to discharge: pt remains in need of stabilization at acute level of care due to acuity of mental health sxs.  Pt continues to have delusional thoughts, intermittent SI.

## 2021-05-13 NOTE — PLAN OF CARE
Problem: Behavioral Health Plan of Care  Goal: Plan of Care Review  Outcome: No Change   This RN informed pt her son called. She stated OK. She presented as disinterested. The son (leelee) states she doesn't even know who he is. The pt presents as confused. She was in the shower at 1700. Will continue to attempt to communicate with pt thru the evening.

## 2021-05-13 NOTE — PLAN OF CARE
Problem: Behavioral Health Plan of Care  Goal: Plan of Care Review  Outcome: No Change    Pt presented with a blunted affect and took her medications. Pt attended group after encouragement from writer. Pt rated her dep at 5/10 and anxiety at 6/10. Pt was tearful and states she hopes to get out of here before her daughters graduation on May 20th. Denied SI/SIB or hallucinations. Spent the majority of the shift in the milieu listening to music.

## 2021-05-13 NOTE — PLAN OF CARE
Pt attended mental health education group. Discussed the ice duncan metaphor regarding the  masks  we wear and what we hide from others. Discussed social support. Pt actively participated in a robust group discussion.

## 2021-05-13 NOTE — PLAN OF CARE
"Nursing Assessment    Mental Status Exam:  Appearance:  Well groomed, Dressed appropriately for weather, and Appears stated age  Behavior/relationship to examiner/demeanor:  Cooperative, Engaged, and Pleasant  Affect (objective appearance):  Blunted/Flat  Mood (subjective report):  pleasant, depressed, and anxious  Gait:  Normal  Speech:  Normal, Soft  Attention/Concentration:  Fair,  Alert, Oriented to person, Oriented to place, Oriented to date/time, and Oriented to situation  Insight:  Fair  Judgment:  Adequate for safety    General Shift Summary  Patient is pleasant, calm, and cooperative today. She endorses some low anxiety and low depression.  She denies SI, HI, SIB, and hallucinations.  She has been up in lounge for most of evening and engages with select peers.  She endorses that being in milieu and socializing has helped with depression.  She says, \"I don't socialize much besides my kids.\"  Patient endorses some concern with number of different medications she has taken while in the hospital.  She says that she is worried because she has a history of suicide attempt by overdose and she does not want access to so many different medications when she discharges.  Writer went over scheduled medications with patient, which is four medications total.  She was concerned with this and especially concerned that she will discharge with all the PRN medications she has taken as well.  Patient was medication complaint today.  She feels that she is most likely to be discharged if she takes her medications as ordered.  Patient is eager to discharge home in time for her daughter's graduation next week.  She also expresses concern about her daughters being able to pay bills if she is in the hospital for too long.  Patient did become briefly tearful when discussing her children.   Plan is to continue to monitor patient status q 15 mins, assess response to medications, and maintain the patients safety.    Irma Alatorre RN "

## 2021-05-13 NOTE — PLAN OF CARE
Attended 2 OT Creative Expression Groups x 45 minutes with 2 and 3 Peers present. Initiated group, was decisive with new task selection and worked independently following 2-3 step verbal directions. Spontaneously social with peers even initiating interactions at times. Declined verbal group focused on self awareness and social skills.

## 2021-05-13 NOTE — PROGRESS NOTES
"[facility],  Psychiatric Progress Note      Impression:     Brittany Durbin is a 45-year-old female admitted to [facility] on 5/10/2021.  She was admitted on a 72-hour hold through [facility], brought in via EMS due to suicidal ideation with a plan to overdose or shoot herself.  She does have access to guns.  She was visiting family and disclosed her suicidal ideation.  She was physically aggressive and was given Haldol & Benadryl by EMS.  Family reported that she was having paranoid thoughts that her boyfriend was taking her money and people were listening to her phone conversations.  She had smoked marijuana a few days prior to admission but states she does not do so regularly.  She most recently consumed alcohol about a week prior to admission.  She reports her drinking \"goes in spurts.\"  UTOX was negative.  She stopped taking Lamictal about a month prior to admission.  She says she stopped taking it due to nausea, though continues to have intermittent nausea \"with certain food.\"  Since admission, Lamictal, Risperdal and Ativan were initiated.  PRNs of Risperdal, Melatonin, Ativan and Zyprexa were also initiated.  Speech and movements are rather slow.  She continues to have paranoid, delusional thought content and intermittent suicidal ideation.  She contracts for safety on the unit.           Diagnoses:     Bipolar disorder, unspecified  PTSD  Possible catatonic symptoms  Alcohol use disorder  History of gastric bypass  Jeff-Parkinson-White syndrome, status post cardiac ablation  History of supraventricular tachycardia  Hypertension         Plan:     Medications:  Continue Lamictal titration.  Continue Risperdal 1 mg at HS.  Continue Ativan 0.5 mg TID.  Continue PRNs of Ativan, Risperdal, Melatonin and Zyprexa.      Discharge to home when stable.  She has outpatient therapy, psychiatry and [facility].  She will need to follow up " "with cardiology at [facility].        Attestation:  Patient has been seen and evaluated by me, Teresa Aldridge, APRN CNP  The patient was counseled on nature of illness and treatment plan/options  Care was coordinated with treatment team         Clinical Global Impressions  First:  Considering your total clinical experience with this particular patient population, how severe are the patient's symptoms at this time?: 6 (05/10/21 1643)  Compared to the patient's condition at the START of treatment, this patient's condition is: 4 (05/10/21 1643)  Most recent:  Considering your total clinical experience with this particular patient population, how severe are the patient's symptoms at this time?: 6 (05/10/21 1643)  Compared to the patient's condition at the START of treatment, this patient's condition is: 4 (05/10/21 1643)             Interim History:     The patient's care was discussed with the treatment team and chart notes were reviewed.  Pt was documented as sleeping 7.5 hours during the overnight shift.  She took PRN Melatonin last night and slept well.  She was very slow and indecisive early in the day yesterday, improved by evening after 2 doses of Ativan.  Improvements remain present today but symptoms are still problematic.  She said her mood is \"okay\" and slightly improved.  Anxiety is lower.  \"I'm not letting everything get to me as much.\"  She denies suicidal ideation but has thoughts that she would rather not wake up.  Her energy is lower than usual.  She reports possible auditory hallucinations.  \"I can't explain them.\"  Sometimes they are her own voice.  She also said, \"My thought process is screwed up.\"  Her appetite is \"okay.\"  She is worried about weight gain from Risperdal but doesn't want to try a different medication.  Reports occasional nausea which is at baseline.           Medications:     Current Facility-Administered Medications   Medication     acetaminophen (TYLENOL) tablet 650 mg     alum " "& mag hydroxide-simethicone (MAALOX) suspension 30 mL     flecainide (TAMBOCOR) tablet 100 mg     hydrALAZINE (APRESOLINE) half-tab 12.5 mg     lamoTRIgine (LaMICtal) tablet 25 mg     LORazepam (ATIVAN) tablet 0.5 mg     LORazepam (ATIVAN) tablet 0.5 mg     melatonin tablet 3 mg     OLANZapine (zyPREXA) tablet 5-10 mg    Or     OLANZapine (zyPREXA) injection 10 mg     ondansetron (ZOFRAN-ODT) ODT tab 4 mg     risperiDONE (risperDAL) tablet 0.5 mg     risperiDONE (risperDAL) tablet 1 mg     senna-docusate (SENOKOT-S/PERICOLACE) 8.6-50 MG per tablet 1 tablet     tiZANidine (ZANAFLEX) tablet 4 mg             Allergies:     Allergies   Allergen Reactions     Aspirin      \"drug eruption\" Skin     Ceclor [Cefaclor]      \"drug eruption\"     Keflex [Cephalexin] Other (See Comments)     \"drug eruption\"     Paxil [Paroxetine] Other (See Comments)     \"feels not right\"     Zoloft [Sertraline] Other (See Comments)     Penicillins Rash     Prozac [Fluoxetine] Other (See Comments)     \"feels not right\"            Psychiatric Examination:     /81   Pulse 83   Temp 98.3  F (36.8  C) (Tympanic)   Resp 16   Wt 82 kg (180 lb 12.8 oz)   SpO2 100%     Appearance:  awake, alert and adequately groomed  Attitude:  somewhat guarded  Eye Contact:  minimal  Mood:  \"okay,\" some improvement, less anxious  Affect:  blunted  Speech:  clear, coherent, rather slow with delays in responding but improved compared to yesterday  Psychomotor Behavior:  no evidence of tardive dyskinesia, dystonia, or tics  Thought Process:  less disorganized  Associations:  no loose associations  Thought Content:  denies homicidal ideation, denies suicidal ideation but states she does not want to wake up, denies hallucinations, paranoid/delusional thought content is present  Insight:  partial  Judgment:  fair  Oriented to:  person, date, time, place  Attention Span and Concentration:  limited  Recent and Remote Memory:  short-term and long-term " impairment  Language:  intact, fluent English  Fund of Knowledge:  appropriate  Muscle Strength and Tone:  normal  Gait and Station:  normal         Labs:     No results found for this or any previous visit (from the past 24 hour(s)).

## 2021-05-14 PROCEDURE — 99233 SBSQ HOSP IP/OBS HIGH 50: CPT | Performed by: NURSE PRACTITIONER

## 2021-05-14 PROCEDURE — G0177 OPPS/PHP; TRAIN & EDUC SERV: HCPCS

## 2021-05-14 PROCEDURE — 124N000002 HC R&B MH UMMC

## 2021-05-14 PROCEDURE — 250N000013 HC RX MED GY IP 250 OP 250 PS 637: Performed by: NURSE PRACTITIONER

## 2021-05-14 PROCEDURE — 90853 GROUP PSYCHOTHERAPY: CPT

## 2021-05-14 RX ORDER — LORAZEPAM 0.5 MG/1
0.5 TABLET ORAL 4 TIMES DAILY
Status: DISCONTINUED | OUTPATIENT
Start: 2021-05-14 | End: 2021-05-18 | Stop reason: HOSPADM

## 2021-05-14 RX ORDER — RISPERIDONE 2 MG/1
2 TABLET ORAL AT BEDTIME
Status: DISCONTINUED | OUTPATIENT
Start: 2021-05-14 | End: 2021-05-18 | Stop reason: HOSPADM

## 2021-05-14 RX ADMIN — LORAZEPAM 0.5 MG: 0.5 TABLET ORAL at 21:33

## 2021-05-14 RX ADMIN — FLECAINIDE ACETATE 100 MG: 100 TABLET ORAL at 21:32

## 2021-05-14 RX ADMIN — RISPERIDONE 2 MG: 2 TABLET ORAL at 21:32

## 2021-05-14 RX ADMIN — LORAZEPAM 0.5 MG: 0.5 TABLET ORAL at 14:56

## 2021-05-14 RX ADMIN — LAMOTRIGINE 25 MG: 25 TABLET ORAL at 21:33

## 2021-05-14 RX ADMIN — LORAZEPAM 0.5 MG: 0.5 TABLET ORAL at 18:23

## 2021-05-14 RX ADMIN — FLECAINIDE ACETATE 100 MG: 100 TABLET ORAL at 07:47

## 2021-05-14 RX ADMIN — LORAZEPAM 0.5 MG: 0.5 TABLET ORAL at 06:04

## 2021-05-14 RX ADMIN — LORAZEPAM 0.5 MG: 0.5 TABLET ORAL at 11:12

## 2021-05-14 RX ADMIN — RISPERIDONE 0.5 MG: 0.5 TABLET ORAL at 14:56

## 2021-05-14 ASSESSMENT — ACTIVITIES OF DAILY LIVING (ADL)
LAUNDRY: WITH SUPERVISION
HYGIENE/GROOMING: INDEPENDENT
DRESS: INDEPENDENT
LAUNDRY: WITH SUPERVISION
ORAL_HYGIENE: INDEPENDENT
ORAL_HYGIENE: INDEPENDENT
HYGIENE/GROOMING: INDEPENDENT
DRESS: INDEPENDENT

## 2021-05-14 NOTE — PLAN OF CARE
Problem: Sleep Disturbance  Goal: Adequate Sleep/Rest  Outcome: Improving   Night Shift Summary (5/13/21 into 05/14/21)    Pt in bed sleeping at start of shift, breathing quiet and unlabored. Pt appeared to have slept for 7 hrs so far.Prn ativan given per request for anxiety.    Pt continues on suicide,assault,SIB,and elopement precautions , with no related events occurring this shift.     Will continue to monitor and assess.  The 15 mins safety checks cont.

## 2021-05-14 NOTE — PLAN OF CARE
Problem: Mood Impairment (Depressive Signs/Symptoms)  Goal: Improved Mood Symptoms (Depressive Signs/Symptoms)  Outcome: Declining  Note: Patient approached the RN with tears in her eyes, said she needs something for her depression. She was unable to describe what she was feeling. Thoughts and speech appeared delayed. Patient said she does not tolerate antidepressants well, as she had become suicidal when on antidepressants in the past.   Denied SI or SIB.   Discussed available PRN options. Patient was unable to make a decision about what would be helpful to her at this time. She was offered PRN Ativan 0.5 mg and PRN Risperidone 0.5 mg, which she agreed and took. One hr later, patient reported feeling better and was working on word finding puzzle.     Addendum: Patient had a better night after the PRNs. She was able to talk and answer questions without delay. Patient said that she feels worst in the morning, when she is under the influence of her dreams. Said she feels like she is waking up from a nightmare, but does not recall the dreams. Said she was taking Prazosin for nightmares in the past, but had unwanted side effects, and had to stop it.     Patient attended all groups, socialized with other patients on the unit, watched TV. She said she got upset when she missed a phone call from her daughter, but was able to control her feelings.   She took all scheduled medications, denied SE, none assessed by staff.

## 2021-05-14 NOTE — PLAN OF CARE
"Music Therapy Group note    Total time in session: 60 minutes     Number of patients in group: 4    Scope of service: cognitive     Patient progress: initial encounter    Patient response/reaction to treatment intervention(s): \"Emily\", as she introduced herself appeared to track well during Music Bingo, often being the first person to identify a musical artist based on a song.  She also made reference to her daughter \"looking like Laverne Bowman when she kan her hair out and wears the big glasses\" , and remembered this with a smile.. She stated she felt \"pretty good!\" after group tonight and her goal was to take her meds this evening and \"clear out my brain\".  More assessment needed.  Calm, cooperative, initially stared blankly out the window, but engaged when the music intervention began.     April Jeff, [facility]  Board-Certified Music Therapist           "

## 2021-05-14 NOTE — PLAN OF CARE
"  CTC Note          Work Completed: reviewed chart and remotely attended team discussion.  Pt signed ROSALINA for her adult children and this writer placed call to Roxanna, who noted she is soon to turn 21 and fine to receive information.  Updated Roxanna on pt's progress and plan to keep her mother over the weekend with continued medication management.  Roxanna reported that she has removed old medication and \"booze\" from pt's house (locked these up) and that family will address any firearms accessible to pt.    She reported that pt has always had \"paranoia and confusion, dramatic spells when she would do things but then wouldn't recall them in the morning\".  \"Mom has BPD, ADD, OCD, PTSD, anxiety and depression and lots of things trigger her\".  \"She recently began to date a blake\" which seemed to be followed by increase of paranoia.  \"I've  learned to stay away when she's angry but now she listens to me\".  She reports she is getting her own help and support.  Roxanna also reports that the pt's [facility] worker is active in pt's case.    Discharge plan or goal: stabilize and discharge to established mental health providers and [facility] worker                Barriers to discharge: acuity of mental health sxs.    "

## 2021-05-14 NOTE — PLAN OF CARE
"  Problem: Behavioral Health Plan of Care  Goal: Adheres to Safety Considerations for Self and Others  Outcome: Improving  Note: Patient denied suicidal thoughts, wishing to be dead, or urges to hurt self. She reported feeling safe here, in the hospital.  Rated depression at 3/10, denied anxiety, hallucinations, delusions, or HI.    Thoughts as per patient: I fee confused and difficult to concentrate or think\", Oriented x 4. Speech is delayed. Mood is blunted and depressed. Affect is calm. Patient was agreeable to answer all asked questions. Memory appeared intact. Denied issues with balance and dizziness.   Appetite is good. Patient said she slept well and does not feel tired this morning. She took PRN Ativan at 06:04 this morning, the scheduled at 08:00 Ativan was held. Patient is medication compliant. She followed directions well. No behavioral issues.    Plan for the day: to attend groups.     Patient requests to wear own clothes.   Intervention: Develop and Maintain Individualized Safety Plan  Recent Flowsheet Documentation  Taken 5/14/2021 5075 by Mayte Call RN  Safety Measures:   environmental rounds completed   clinical history reviewed   safety rounds completed     "
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
## 2021-05-14 NOTE — PLAN OF CARE
Attended 2 OT Creative Expression Groups x 45 minutes each with 2 and 3 peers present and 1 cognitive leisure skills group x 45 minutes with 1 peer. Initiated groups and actively participated with set up and 2-3 step verbal directions and demonstration. Social upon approach. Responsive to support and encouragement. Noted appreciation for distraction, purposeful use of time and learning new tasks/activities. Seemed less overwhelmed in small group size as she was engaging, ask questions and had patel range of affect.

## 2021-05-15 PROCEDURE — G0177 OPPS/PHP; TRAIN & EDUC SERV: HCPCS

## 2021-05-15 PROCEDURE — 124N000002 HC R&B MH UMMC

## 2021-05-15 PROCEDURE — 250N000013 HC RX MED GY IP 250 OP 250 PS 637: Performed by: NURSE PRACTITIONER

## 2021-05-15 RX ADMIN — RISPERIDONE 0.5 MG: 0.5 TABLET ORAL at 16:05

## 2021-05-15 RX ADMIN — LORAZEPAM 0.5 MG: 0.5 TABLET ORAL at 16:05

## 2021-05-15 RX ADMIN — MELATONIN TAB 3 MG 3 MG: 3 TAB at 21:27

## 2021-05-15 RX ADMIN — LORAZEPAM 0.5 MG: 0.5 TABLET ORAL at 08:54

## 2021-05-15 RX ADMIN — RISPERIDONE 2 MG: 2 TABLET ORAL at 21:28

## 2021-05-15 RX ADMIN — FLECAINIDE ACETATE 100 MG: 100 TABLET ORAL at 21:27

## 2021-05-15 RX ADMIN — LAMOTRIGINE 25 MG: 25 TABLET ORAL at 21:28

## 2021-05-15 RX ADMIN — LORAZEPAM 0.5 MG: 0.5 TABLET ORAL at 21:28

## 2021-05-15 RX ADMIN — FLECAINIDE ACETATE 100 MG: 100 TABLET ORAL at 08:54

## 2021-05-15 RX ADMIN — LORAZEPAM 0.5 MG: 0.5 TABLET ORAL at 11:28

## 2021-05-15 ASSESSMENT — ACTIVITIES OF DAILY LIVING (ADL)
ORAL_HYGIENE: INDEPENDENT
ORAL_HYGIENE: INDEPENDENT
HYGIENE/GROOMING: INDEPENDENT
DRESS: INDEPENDENT
LAUNDRY: WITH SUPERVISION
DRESS: INDEPENDENT
HYGIENE/GROOMING: INDEPENDENT

## 2021-05-15 NOTE — PLAN OF CARE
Patient med-compliant and cooperative. Patient in lounge playing a game with peers most of shift (improved interaction). Patient made several phone calls and ate 100%. Patient became irritable after being asked to remain in her room during a code (with a flat affect remaining slow to respond). Patient offers now response when asked to rate depression/anxiety.     Patient denies SI/SIB.    BP (!) 136/92 (BP Location: Left arm)   Pulse 91   Temp 97.7  F (36.5  C) (Oral)   Resp 16   Wt 82 kg (180 lb 12.8 oz)   SpO2 99% . No  co/o pain.    Nursing will continue to monitor.

## 2021-05-15 NOTE — PROGRESS NOTES
" 05/14/21 2200   Groups   Details    (Psychotherapy)   Number of patients attending the group:  4  Group Length:  1 Hours     Group Therapy Type:      Summary of Group / Topics Discussed:        The  Psychotherapy group goal is to promote insight to positive choice and change. Group processing is within a supportive and safe environment. Patients will process emotions using verbal group and expressive psychotherapy interventions including visual art/writing interventions.     Group interventions support patients by: learn positive coping mechanisms and self efficacy/empowerment/ self awareness     Modalities to reach these goals include:  Writing journal prompts/ positive psychology      Subjective -patient report of mood today-\"very up and down day, abnormally sad\".     Objective/ Intervention- Goal of group and Therapeutic modality utilized- writing directives     Group Response- engaged     Patient Response-Pt participated in  group .She was pleasant, cooperative and engaged in group. She wrote thoughtful responses to journal prompts. She brightened as group went on. She enjoyed the social aspect of group and spoke about her love of her family, love of music etc.    Milind Tierney, AURELIA, ATR-BC       "

## 2021-05-16 PROCEDURE — 124N000002 HC R&B MH UMMC

## 2021-05-16 PROCEDURE — 250N000013 HC RX MED GY IP 250 OP 250 PS 637: Performed by: NURSE PRACTITIONER

## 2021-05-16 RX ADMIN — LORAZEPAM 0.5 MG: 0.5 TABLET ORAL at 21:31

## 2021-05-16 RX ADMIN — FLECAINIDE ACETATE 100 MG: 100 TABLET ORAL at 21:31

## 2021-05-16 RX ADMIN — LAMOTRIGINE 25 MG: 25 TABLET ORAL at 21:32

## 2021-05-16 RX ADMIN — RISPERIDONE 2 MG: 2 TABLET ORAL at 21:31

## 2021-05-16 RX ADMIN — LORAZEPAM 0.5 MG: 0.5 TABLET ORAL at 08:12

## 2021-05-16 RX ADMIN — FLECAINIDE ACETATE 100 MG: 100 TABLET ORAL at 08:12

## 2021-05-16 RX ADMIN — LORAZEPAM 0.5 MG: 0.5 TABLET ORAL at 12:46

## 2021-05-16 ASSESSMENT — ACTIVITIES OF DAILY LIVING (ADL)
ORAL_HYGIENE: INDEPENDENT
DRESS: INDEPENDENT
HYGIENE/GROOMING: INDEPENDENT
LAUNDRY: WITH SUPERVISION
DRESS: INDEPENDENT
HYGIENE/GROOMING: INDEPENDENT
ORAL_HYGIENE: INDEPENDENT
LAUNDRY: WITH SUPERVISION

## 2021-05-16 NOTE — PROGRESS NOTES
"1600: Patient refused dose #3 of qid ativan stating \"I want to try without it.\"    Patient awake and alert participating in groups/peer activity.    Nursing will continue to monitor.  "

## 2021-05-16 NOTE — PLAN OF CARE
"Patient rates anxiety at 3/10 and depression at 3/10 (scale is 1-10 with 10 being the worst) discussing \"I have struggled with Covid and trying to support my youngest through her last year of school and now she is graduating and I have been relieved and worried about what comes next for me\" and talks about \"my parents were terrible and left me feeling unwanted (tears) which makes me guilty that I may not have been the best parent.\" Patient GOAL is \"to be home for May 20 graduation party for my daughter and I would like to be home Tuesday to participate in set-up for party festivities.\" Patient wants to know if FAX sent to her work and would update from  on discharge plan.     Patient is cooperative, med-compliant, eating 75%, social with peers and sleeping \"good.\" Patient is more spontaneous, communication is clear and ADL are independent. Patient denies auditory/visual hallucinations.    Patient denies SI/SIB.    /87 (BP Location: Left arm)   Pulse 75   Temp 98.3  F (36.8  C) (Oral)   Resp 16   Wt 83.4 kg (183 lb 14.4 oz)   SpO2 97% . Patient denies pain.    Nursing will continue to monitor.    "

## 2021-05-16 NOTE — PLAN OF CARE
"  Problem: Mood Impairment (Depressive Signs/Symptoms)  Goal: Improved Mood Symptoms (Depressive Signs/Symptoms)  5/15/2021 2227 by Otto Sinclair, RN  Outcome: No Change  Flowsheets (Taken 5/15/2021 2227)  Mutually Determined Action Steps (Improved Mood Symptoms): verbalizes increased insight  5/15/2021 2227 by Otto Sinclair, RN  Outcome: No Change  Flowsheets (Taken 5/15/2021 2227)  Mutually Determined Action Steps (Improved Mood Symptoms): verbalizes increased insight  Intervention: Promote Mood Improvement  Recent Flowsheet Documentation  Taken 5/15/2021 1615 by Otto Sinclair, RN  Diversional Activity: art work     Pt is blunted, flat and attended groups. Pt was teary in the beginning of shift stating \"I've been set up and that's when I'm here\". When asked pt to elaborate pt became paranoid and did not want to discuss hospitalization with writer. Pt rates anxiety at 4/10 and depression 7/10. Pt rates pain at 0/10. Pt reports no SI/HI and contracts for safety. Pt was visible on unit and social with peers. Pt denies any hallucinations. Pt was med compliant. Pt received prn risperidone at 1600. Continue current POC.    "

## 2021-05-17 LAB
LABORATORY COMMENT REPORT: NORMAL
SARS-COV-2 RNA RESP QL NAA+PROBE: NEGATIVE
SPECIMEN SOURCE: NORMAL

## 2021-05-17 PROCEDURE — 250N000013 HC RX MED GY IP 250 OP 250 PS 637: Performed by: NURSE PRACTITIONER

## 2021-05-17 PROCEDURE — 99232 SBSQ HOSP IP/OBS MODERATE 35: CPT | Performed by: NURSE PRACTITIONER

## 2021-05-17 PROCEDURE — G0177 OPPS/PHP; TRAIN & EDUC SERV: HCPCS

## 2021-05-17 PROCEDURE — 87635 SARS-COV-2 COVID-19 AMP PRB: CPT | Performed by: NURSE PRACTITIONER

## 2021-05-17 PROCEDURE — 124N000002 HC R&B MH UMMC

## 2021-05-17 RX ORDER — RISPERIDONE 0.5 MG/1
0.5 TABLET ORAL 2 TIMES DAILY PRN
Qty: 60 TABLET | Refills: 1 | Status: SHIPPED | OUTPATIENT
Start: 2021-05-17

## 2021-05-17 RX ORDER — LORAZEPAM 0.5 MG/1
0.5 TABLET ORAL 4 TIMES DAILY
Qty: 70 TABLET | Refills: 0 | Status: SHIPPED | OUTPATIENT
Start: 2021-05-17

## 2021-05-17 RX ORDER — LANOLIN ALCOHOL/MO/W.PET/CERES
3 CREAM (GRAM) TOPICAL
Start: 2021-05-17

## 2021-05-17 RX ORDER — RISPERIDONE 2 MG/1
2 TABLET ORAL AT BEDTIME
Qty: 30 TABLET | Refills: 1 | Status: SHIPPED | OUTPATIENT
Start: 2021-05-17

## 2021-05-17 RX ORDER — ACYCLOVIR 200 MG/1
200 CAPSULE ORAL
Start: 2021-05-17

## 2021-05-17 RX ORDER — LAMOTRIGINE 25 MG/1
25 TABLET ORAL AT BEDTIME
Qty: 66 TABLET | Refills: 0 | Status: SHIPPED | OUTPATIENT
Start: 2021-05-17

## 2021-05-17 RX ORDER — ONDANSETRON 4 MG/1
4 TABLET, ORALLY DISINTEGRATING ORAL EVERY 6 HOURS PRN
Qty: 30 TABLET | Refills: 1 | Status: SHIPPED | OUTPATIENT
Start: 2021-05-17

## 2021-05-17 RX ORDER — FLECAINIDE ACETATE 100 MG/1
100 TABLET ORAL 2 TIMES DAILY
Qty: 60 TABLET | Refills: 1 | Status: SHIPPED | OUTPATIENT
Start: 2021-05-17

## 2021-05-17 RX ADMIN — LORAZEPAM 0.5 MG: 0.5 TABLET ORAL at 16:37

## 2021-05-17 RX ADMIN — LORAZEPAM 0.5 MG: 0.5 TABLET ORAL at 04:47

## 2021-05-17 RX ADMIN — LAMOTRIGINE 25 MG: 25 TABLET ORAL at 21:24

## 2021-05-17 RX ADMIN — LORAZEPAM 0.5 MG: 0.5 TABLET ORAL at 12:07

## 2021-05-17 RX ADMIN — RISPERIDONE 2 MG: 2 TABLET ORAL at 21:24

## 2021-05-17 RX ADMIN — FLECAINIDE ACETATE 100 MG: 100 TABLET ORAL at 08:16

## 2021-05-17 RX ADMIN — FLECAINIDE ACETATE 100 MG: 100 TABLET ORAL at 21:23

## 2021-05-17 RX ADMIN — LORAZEPAM 0.5 MG: 0.5 TABLET ORAL at 08:16

## 2021-05-17 RX ADMIN — MELATONIN TAB 3 MG 3 MG: 3 TAB at 21:24

## 2021-05-17 RX ADMIN — LORAZEPAM 0.5 MG: 0.5 TABLET ORAL at 21:24

## 2021-05-17 ASSESSMENT — ACTIVITIES OF DAILY LIVING (ADL)
DRESS: STREET CLOTHES
HYGIENE/GROOMING: INDEPENDENT
LAUNDRY: WITH SUPERVISION
ORAL_HYGIENE: INDEPENDENT

## 2021-05-17 NOTE — PLAN OF CARE
"    CTC NOTE        Work Completed: reviewed chart and remotely attended team discussion.  Entered team note.   Made appointments for psychiatry, therapy and cardiology.  Completed AVS  Tried to call pt's son, Jass, back at [phone] (the number given to this writer) but later found it to be in error.and correct number is [phone].  Reached Jass.  This writer gave Jass an update and informed that pt will be discharged tomorrow.  Jass reported how pt appeared initially \"it was like a horror movie\".   He added that some good things have come out of it such as the possible need for POA so no ROSALINA would be needed if and when a future episode occurs.     Discharge plan or goal:     LELAND Zaldivar, CNP - [facility] Psychiatry  Appointment scheduled on Monday 6/14/2021 at 9:30am  (This will be in person)  [address] [place] [id number] [phone]  Fax: [phone]     [facility]  [phone] or [phone]      Therapy - [facility]p [facility]  Appointment 5/19/21 @ 9am (this is not in person as your provider does not offer this yet but continue to work with her regarding changes)  Your therapist can also refer you to any additional services you are requesting.  [facility]  [address]  [address]  [place] [address]  Phone: [phone]   Fax: [phone]    You have been recommended to follow up with cardiology at [facility] location  An appointment has been scheduled on Wed 6/2/21 with Dr. Renteria @ 2:15pm (this is an in person appointment)  [address], [address], [place], [place], [address]   Phone: [phone]                  Barriers to discharge: barriers are being addressed.    "

## 2021-05-17 NOTE — PLAN OF CARE
"Patient said she is feeling better. She said, \"I think it's because my last kid is graduating, I thinks that what caused my breakdown.\" Patient teared up when she said this. She took all of her morning medications and has not asked for prn meds so far this shift. She mentioned she felt very anxious when she woke up sometime early in the morning and took prn Lorazepam. \"I don't know why I'm so anxious in the morning.\" Patient has been social with peers and attending groups. She denied suicidal thoughts. Plan is for discharge tomorrow..  "

## 2021-05-17 NOTE — DISCHARGE INSTRUCTIONS
Behavioral Discharge Planning and Instructions    Summary:     You were admitted on 5/10/2021  due to Disorganized Thinking/Behaviors and Suicidal Ideations.  You were treated by Mary Jane Aldridge CNP and discharged on 5/18/2021 from station 32 to Home      Main Diagnosis:     Bipolar disorder, depressed, with recent catatonic symptoms  PTSD  Alcohol use disorder  History of gastric bypass  Jeff-Parkinson-White syndrome, status post cardiac ablation  History of supraventricular tachycardia  Hypertension    Health Care Follow-up:      PCP - Dr. Sabrina Dobson  [facility]   [address]  [phone]    LELAND Zaldivar, CNP - [facility] Psychiatry  Appointment scheduled on Monday 6/14/2021 at 9:30am  (This will be in person)  [address] [place] [phone]  Fax: [phone]     [facility]  [phone] or [phone]      Therapy - Lauren Harp [facility]  Appointment 5/19/21 @ 9am (this is not in person as your provider does not offer this yet but continue to work with her regarding changes)  Your therapist can also refer you to any additional services you are requesting.  [facility]  [address] [address]  Phone: [phone]   Fax: [phone]    You have been recommended to follow up with cardiology at [facility] location  An appointment has been scheduled on Wed 6/2/21 with Dr. Renteria @ 2:15pm (this is an in person appointment)  [address] [facility], [facility] [address], [place], [address]   Phone: [phone]      Attend all scheduled appointments with your outpatient providers. Call at least 24 hours in advance if you need to reschedule an appointment to ensure continued access to your outpatient providers.     Major Treatments, Procedures and Findings:  You were provided with: a psychiatric assessment, assessed for medical stability, medication  "evaluation and/or management and milieu management    Symptoms to Report: increased confusion, mood getting worse or thoughts of suicide    Early warning signs can include: increased depression or anxiety sleep disturbances increased thoughts or behaviors of suicide or self-harm  increased unusual thinking, such as paranoia or hearing voices    Safety and Wellness:  Take all medicines as directed.  Make no changes unless your doctor suggests them.      Follow treatment recommendations.  Refrain from alcohol and non-prescribed drugs.  If there is a concern for safety, call 911.    Resources:   Crisis Intervention: 673.767.5205 or 399-577-0556 (TTY: 988.730.1797).  Call anytime for help.  National [place] on Mental Illness (www.[place].sundar.org): 774.849.7241 or 461-911-3376.  Suicide Awareness Voices of Education (SAVE) (www.save.org): 174-511-SAVE (7283)  Text 4 Life: txt \"LIFE\" to 28968 for immediate support and crisis intervention  Crisis text line: Text \"[place]\" to 696863. Free, confidential, 24/7.  Crisis Intervention: 990.941.7941 or 577-340-0750. Call anytime for help.     General Medication Instructions:   See your medication sheet(s) for instructions.   Take all medicines as directed.  Make no changes unless your doctor suggests them.   Go to all your doctor visits.  Be sure to have all your required lab tests. This way, your medicines can be refilled on time.  Do not use any drugs not prescribed by your doctor.  Avoid alcohol.    Advance Directives:   Scanned document on file with [place]? No scanned doc  Is document scanned? Pt states no documents  Honoring Choices Your Rights Handout: Informed and given  Was more information offered? Materials given    The Treatment team has appreciated the opportunity to work with you. If you have any questions or concerns about your recent admission, you can contact the unit which can receive your call 24 hours a day, 7 days a week. They will be able to get in touch with a " Provider if needed. The unit number is [id number] .

## 2021-05-17 NOTE — PLAN OF CARE
Pt requested medication for anxiety. Pt given prn ativan. Pt took medication and returned to her room. Pt appeared to have flat affect with minimal interaction with staff.

## 2021-05-17 NOTE — PLAN OF CARE
Patient slept approximately 6 hours during the overnight shift; Patient is currently in her room resting in bed. Nursing will continue to monitor.
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
## 2021-05-17 NOTE — PLAN OF CARE
BEHAVIORAL TEAM DISCUSSION    Participants: Mary Jane Aldridge, EDWAR; Kathy Delaney and Mely Shepherd, SHAW's; Erika Yee, RN  Progress: pt is demonstrating insight into her situation, with improved thinking, and significant decrease in paranoid thought process.   Anticipated length of stay: anticipate discharge tomorrow.  Continued Stay Criteria/Rationale: pt will transition to OP LOC after discharge.    Medical/Physical: cardiology appointment has been arranged for pt post discharge.  Precautions:   Behavioral Orders   Procedures     Assault precautions     Code 1 - Restrict to Unit     Routine Programming     As clinically indicated     Self Injury Precaution     Status 15     Every 15 minutes.     Suicide precautions     Patients on Suicide Precautions should have a Combination Diet ordered that includes a Diet selection(s) AND a Behavioral Tray selection for Safe Tray - with utensils, or Safe Tray - NO utensils       Plan: pt will discharge tomorrow to OP LOC including psychiatry and therapy  Rationale for change in precautions or plan: transition in process from IP LOC to OP LOC

## 2021-05-17 NOTE — PLAN OF CARE
"Patient improved today and explains depression/anxiety \"are because of youngest daughter graduation and life changes coming.\" Patient was med-compliant (declining 4 PM ativan) and was social/appropriate with peers/staff and played games in lounge with peers. Patient has a flat affect.     Patient denies SI/SIB.    /78 (BP Location: Left arm)   Pulse 83   Temp 97.7  F (36.5  C) (Tympanic)   Resp 16   Wt 83.4 kg (183 lb 14.4 oz)   SpO2 96% .     Nursing will continue to monitor.  "
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
## 2021-05-17 NOTE — PROGRESS NOTES
"[facility],  Psychiatric Progress Note      Impression:     Brittany Durbin is a 45-year-old female admitted to [facility] on 5/10/2021.  She was admitted on a 72-hour hold through [facility], brought in via EMS due to suicidal ideation with a plan to overdose or shoot herself.  She does have access to guns.  She was visiting family and disclosed her suicidal ideation.  She was physically aggressive and was given Haldol & Benadryl by EMS.  Family reported that she was having paranoid thoughts that her boyfriend was taking her money and people were listening to her phone conversations.  She had smoked marijuana a few days prior to admission but states she does not do so regularly.  She most recently consumed alcohol about a week prior to admission.  She reports her drinking \"goes in spurts.\"  UTOX was negative.  She stopped taking Lamictal about a month prior to admission.  She says she stopped taking it due to nausea, though continues to have intermittent nausea \"with certain food.\"  Since admission, Lamictal, Risperdal and Ativan were initiated.  PRNs of Risperdal, Melatonin, Ativan and Zyprexa were also initiated.  Paranoia is reduced and generally only occurs for a short period of time soon after she wakes up.  Thoughts are better organized.           Diagnoses:     Bipolar disorder, depressed, with recent catatonic symptoms  PTSD  Alcohol use disorder  History of gastric bypass  Jeff-Parkinson-White syndrome, status post cardiac ablation  History of supraventricular tachycardia  Hypertension         Plan:     Medications:  Continue Lamictal titration.  Continue Risperdal 2 mg at HS.  Continue Ativan 0.5 mg QID.  Continue PRNs of Ativan, Risperdal, Melatonin and Zyprexa.  Discharge meds ordered.     Plan to discharge home late AM tomorrow.  She has outpatient therapy, psychiatry and [facility].          Attestation:  Patient has " "been seen and evaluated by me, LELAND Max CNP  The patient was counseled on nature of illness and treatment plan/options  Care was coordinated with treatment team         Clinical Global Impressions  First:  Considering your total clinical experience with this particular patient population, how severe are the patient's symptoms at this time?: 6 (05/10/21 1643)  Compared to the patient's condition at the START of treatment, this patient's condition is: 4 (05/10/21 1643)  Most recent:  Considering your total clinical experience with this particular patient population, how severe are the patient's symptoms at this time?: 6 (05/10/21 1643)  Compared to the patient's condition at the START of treatment, this patient's condition is: 4 (05/10/21 1643)             Interim History:     The patient's care was discussed with the treatment team and chart notes were reviewed.  Pt was documented as sleeping well during the weekend overnight shifts.  She occasionally utilized PRNs of Ativan, Risperdal and Melatonin.  She refused 1 dose of scheduled Ativan over the weekend.  Staff report that she has been calm and cooperative, though did make 1 paranoid statement about being \"set up\" to be hospitalized.  During today's conversation, pt reports that she is feeling \"pretty good, a little emotional\" with some anxiety and sadness.  She denies suicidal ideation but reports that she sometimes has thoughts that she would rather not wake up.  States that her memory is improving.  Speech was fairly brisk and movements were normal today.  She reports some paranoia upon waking and remembering her dreams and questions whether they were events that truly happend to her, but this dissipates within several minutes.  She denies hallucinations.  States her nausea is at baseline.  She has been in contact with her children.  \"They said I sound better.\"  Pt reports she would like to discharge tomorrow to prepare for her daughter's " "graduation.  Discussed and ordered medications for discharge.          Medications:     Current Facility-Administered Medications   Medication     acetaminophen (TYLENOL) tablet 650 mg     alum & mag hydroxide-simethicone (MAALOX) suspension 30 mL     flecainide (TAMBOCOR) tablet 100 mg     hydrALAZINE (APRESOLINE) half-tab 12.5 mg     lamoTRIgine (LaMICtal) tablet 25 mg     LORazepam (ATIVAN) tablet 0.5 mg     LORazepam (ATIVAN) tablet 0.5 mg     melatonin tablet 3 mg     OLANZapine (zyPREXA) tablet 5-10 mg    Or     OLANZapine (zyPREXA) injection 10 mg     ondansetron (ZOFRAN-ODT) ODT tab 4 mg     risperiDONE (risperDAL) tablet 0.5 mg     risperiDONE (risperDAL) tablet 2 mg     senna-docusate (SENOKOT-S/PERICOLACE) 8.6-50 MG per tablet 1 tablet     tiZANidine (ZANAFLEX) tablet 4 mg             Allergies:     Allergies   Allergen Reactions     Aspirin      \"drug eruption\" Skin     Ceclor [Cefaclor]      \"drug eruption\"     Keflex [Cephalexin] Other (See Comments)     \"drug eruption\"     Paxil [Paroxetine] Other (See Comments)     \"feels not right\"     Zoloft [Sertraline] Other (See Comments)     Penicillins Rash     Prozac [Fluoxetine] Other (See Comments)     \"feels not right\"            Psychiatric Examination:     BP (!) 139/95 (BP Location: Left arm)   Pulse 80   Temp 98.2  F (36.8  C) (Oral)   Resp 16   Wt 83.4 kg (183 lb 14.4 oz)   SpO2 98%     Appearance:  awake, alert and adequately groomed  Attitude:  cooperative  Eye Contact:  fair  Mood:  \"pretty good, a little emotional\"  Affect:  less blunted  Speech:  clear, coherent, no delays in responses  Psychomotor Behavior:  no evidence of tardive dyskinesia, dystonia, or tics  Thought Process:  linear, goal-oriented  Associations:  no loose associations  Thought Content:  denies homicidal ideation, denies suicidal ideation but states she sometimes does not want to wake up, denies hallucinations, occasional paranoid/delusional thought content for several " minutes after she wakes from a dream  Insight:  partial  Judgment:  fair  Oriented to:  person, date, time, place  Attention Span and Concentration:  fair, improving  Recent and Remote Memory:  short-term and long-term impairment but improved compared to admission  Language:  intact, fluent English  Fund of Knowledge:  appropriate  Muscle Strength and Tone:  normal  Gait and Station:  normal         Labs:     No results found for this or any previous visit (from the past 24 hour(s)).

## 2021-05-17 NOTE — PROGRESS NOTES
Brittany  attended 3 of 3 OT groups today. Flat affect, slow speech. Comments have a pessimistic, slightly helpless tone. Appears dysphoric. She participated in OT clinic this a.m., where she initiated a chosen project (watercolor painting), followed through with plan, and asked for support with supplies as needed. This afternoon she participated in a Health and Wellbeing discussion group on self-care practices to support whole wellbeing, as well as a reflection session involving the 5 senses.      05/17/21 1500   Occupational Therapy   Type of Intervention structured groups   Response Initiates, socially acceptable   Hours 3

## 2021-05-18 VITALS
HEART RATE: 85 BPM | WEIGHT: 185.7 LBS | DIASTOLIC BLOOD PRESSURE: 87 MMHG | TEMPERATURE: 98.7 F | OXYGEN SATURATION: 98 % | SYSTOLIC BLOOD PRESSURE: 120 MMHG | RESPIRATION RATE: 16 BRPM

## 2021-05-18 PROCEDURE — 250N000013 HC RX MED GY IP 250 OP 250 PS 637: Performed by: NURSE PRACTITIONER

## 2021-05-18 PROCEDURE — 99239 HOSP IP/OBS DSCHRG MGMT >30: CPT | Performed by: NURSE PRACTITIONER

## 2021-05-18 PROCEDURE — G0177 OPPS/PHP; TRAIN & EDUC SERV: HCPCS

## 2021-05-18 RX ADMIN — LORAZEPAM 0.5 MG: 0.5 TABLET ORAL at 10:21

## 2021-05-18 RX ADMIN — LORAZEPAM 0.5 MG: 0.5 TABLET ORAL at 08:04

## 2021-05-18 RX ADMIN — FLECAINIDE ACETATE 100 MG: 100 TABLET ORAL at 08:04

## 2021-05-18 ASSESSMENT — ACTIVITIES OF DAILY LIVING (ADL)
LAUNDRY: WITH SUPERVISION
ORAL_HYGIENE: INDEPENDENT
DRESS: STREET CLOTHES
HYGIENE/GROOMING: INDEPENDENT

## 2021-05-18 NOTE — PLAN OF CARE
Patient slept approximately 7 hours during the overnight shift; appeared comfortable with even and unlabored breathing while asleep. No issues or concerns reported or observed. Nursing will continue to monitor.

## 2021-05-18 NOTE — PLAN OF CARE
"Nursing Assessment     Pt had calm shift. Pt visible in the milieu. Pt did not attend group this shift. Throughout the shift, pt appeared flat, blunted and anxious. Reports increased anxiety related to news that daughters bank account was hacked. Pt denies SI, SIB, HI and AVH. Pt contracts for safety. Pt ate 100% of dinner. Appetite appears WDL. Pt denies any concerns with sleep however administered PRN melatonin to improve sleep hygiene. No medical concerns. Pt took medications without issue. Pt reports feeling ready to discharge tomorrow however remarks that she believes she could benefit to stay a few more days to be more \"stable.\" Pt notes youngest daughter high school graduation as motivation to follow through with discharge plans. Pt educated on plan of care and encouraged to continue working toward goals.    Pt remains on SI, SIB and Assault precautions. Will continue to monitor and support.       "

## 2021-05-18 NOTE — PLAN OF CARE
Attended 2 OT Groups with 2 peers in each group. Anxious mood as she preps for discharge. Was able to follow 2-3 step verbal directions to complete simple creative task. Noted she is hopeful that she will maintain post discharge. Discussed progress made and strategies to follow through with post discharge.

## 2021-05-18 NOTE — PROGRESS NOTES
Patient was discharged from [facility] to home today with her daughter. Discharge instructions were reviewed with the patient, and she verbalized understanding of them. A 30 day supply of meds was sent home with patient. All belongings were returned to patient. Patient denied thoughts of suicide at time of discharge.

## 2021-09-25 ENCOUNTER — HEALTH MAINTENANCE LETTER (OUTPATIENT)
Age: 46
End: 2021-09-25

## 2022-03-06 ENCOUNTER — HEALTH MAINTENANCE LETTER (OUTPATIENT)
Age: 47
End: 2022-03-06

## 2022-05-01 ENCOUNTER — HEALTH MAINTENANCE LETTER (OUTPATIENT)
Age: 47
End: 2022-05-01

## 2022-12-25 ENCOUNTER — HEALTH MAINTENANCE LETTER (OUTPATIENT)
Age: 47
End: 2022-12-25

## 2023-04-16 ENCOUNTER — HEALTH MAINTENANCE LETTER (OUTPATIENT)
Age: 48
End: 2023-04-16

## 2023-06-02 ENCOUNTER — HEALTH MAINTENANCE LETTER (OUTPATIENT)
Age: 48
End: 2023-06-02

## 2024-05-16 NOTE — PROGRESS NOTES
05/10/21 0251   Patient Belongings   Did you bring any home meds/supplements to the hospital?  No   Patient Belongings locker   Patient Belongings Put in Hospital Secure Location (Security or Locker, etc.) cell phone/electronics;clothing;shoes   Belongings Search Yes     Kept in Locker:    Sandals, Sweat pants, Shirt, sweater and cell phone (Iphone Pink Color)        A               Admission:  I am responsible for any personal items that are not sent to the safe or pharmacy.  [facility] is not responsible for loss, theft or damage of any property in my possession.    Signature:  _________________________________ Date: _______  Time: _____                                              Staff Signature:  ____________________________ Date: ________  Time: _____      2nd Staff person, if patient is unable/unwilling to sign:    Signature: ________________________________ Date: ________  Time: _____     Discharge:  [facility] has returned all of my personal belongings:    Signature: _________________________________ Date: ________  Time: _____                                          Staff Signature:  ____________________________ Date: ________  Time: _____                      [[id number]]